# Patient Record
Sex: MALE | Race: WHITE
[De-identification: names, ages, dates, MRNs, and addresses within clinical notes are randomized per-mention and may not be internally consistent; named-entity substitution may affect disease eponyms.]

---

## 2017-07-12 ENCOUNTER — HOSPITAL ENCOUNTER (OUTPATIENT)
Dept: HOSPITAL 42 - ED | Age: 47
Setting detail: OBSERVATION
LOS: 1 days | Discharge: HOME | End: 2017-07-13
Attending: FAMILY MEDICINE | Admitting: FAMILY MEDICINE
Payer: COMMERCIAL

## 2017-07-12 VITALS — BODY MASS INDEX: 39.9 KG/M2

## 2017-07-12 DIAGNOSIS — I25.10: ICD-10-CM

## 2017-07-12 DIAGNOSIS — I25.2: ICD-10-CM

## 2017-07-12 DIAGNOSIS — E11.65: Primary | ICD-10-CM

## 2017-07-12 DIAGNOSIS — Z79.84: ICD-10-CM

## 2017-07-12 DIAGNOSIS — I87.2: ICD-10-CM

## 2017-07-12 DIAGNOSIS — E66.01: ICD-10-CM

## 2017-07-12 DIAGNOSIS — I50.9: ICD-10-CM

## 2017-07-12 DIAGNOSIS — Z79.82: ICD-10-CM

## 2017-07-12 DIAGNOSIS — Z95.5: ICD-10-CM

## 2017-07-12 DIAGNOSIS — H81.10: ICD-10-CM

## 2017-07-12 DIAGNOSIS — Z87.891: ICD-10-CM

## 2017-07-12 LAB
ALBUMIN SERPL-MCNC: 2.8 G/DL (ref 3–4.8)
ALBUMIN/GLOB SERPL: 0.9 {RATIO} (ref 1.1–1.8)
ALT SERPL-CCNC: 19 U/L (ref 7–56)
APPEARANCE UR: CLEAR
APTT BLD: 27.1 SECONDS (ref 23.7–30.8)
AST SERPL-CCNC: 15 U/L (ref 15–59)
BACTERIA #/AREA URNS HPF: (no result) /[HPF]
BASOPHILS # BLD AUTO: 0.01 K/MM3 (ref 0–2)
BASOPHILS NFR BLD: 0.1 % (ref 0–3)
BILIRUB UR-MCNC: NEGATIVE MG/DL
BNP SERPL-MCNC: 1040 PG/ML (ref 0–450)
BUN SERPL-MCNC: 24 MG/DL (ref 7–21)
CALCIUM SERPL-MCNC: 8.2 MG/DL (ref 8.4–10.5)
COLOR UR: YELLOW
EOSINOPHIL # BLD: 0.2 10*3/UL (ref 0–0.7)
EOSINOPHIL NFR BLD: 1.6 % (ref 1.5–5)
ERYTHROCYTE [DISTWIDTH] IN BLOOD BY AUTOMATED COUNT: 14.5 % (ref 11.5–14.5)
GFR NON-AFRICAN AMERICAN: 54
GLUCOSE UR STRIP-MCNC: >=1000 MG/DL
GRANULOCYTES # BLD: 7.32 10*3/UL (ref 1.4–6.5)
GRANULOCYTES NFR BLD: 77.1 % (ref 50–68)
HGB BLD-MCNC: 12.4 GM/DL (ref 14–18)
INR PPP: 0.93 (ref 0.93–1.08)
LEUKOCYTE ESTERASE UR-ACNC: NEGATIVE LEU/UL
LYMPHOCYTES # BLD: 1.4 10*3/UL (ref 1.2–3.4)
LYMPHOCYTES NFR BLD AUTO: 14.4 % (ref 22–35)
MCH RBC QN AUTO: 24 PG (ref 25–35)
MCHC RBC AUTO-ENTMCNC: 32.5 G/DL (ref 31–37)
MCV RBC AUTO: 73.9 FL (ref 80–105)
MONOCYTES # BLD AUTO: 0.7 10*3/UL (ref 0.1–0.6)
MONOCYTES NFR BLD: 6.8 % (ref 1–6)
PH UR STRIP: 6 [PH] (ref 4.7–8)
PLATELET # BLD: 255 10^3/UL (ref 120–450)
PMV BLD AUTO: 9.8 FL (ref 7–11)
PROT UR STRIP-MCNC: 100 MG/DL
PROTHROMBIN TIME: 10 SECONDS (ref 9.9–11.8)
RBC # BLD AUTO: 5.17 10^6/UL (ref 3.5–6.1)
RBC # UR STRIP: (no result) /UL
RBC #/AREA URNS HPF: (no result) /HPF (ref 0–2)
SP GR UR STRIP: 1.01 (ref 1–1.03)
TROPONIN I SERPL-MCNC: < 0.01 NG/ML
URINE NITRATE: NEGATIVE
UROBILINOGEN UR STRIP-ACNC: 0.2 E.U./DL
WBC # BLD AUTO: 9.5 10^3/UL (ref 4.5–11)
WBC #/AREA URNS HPF: NEGATIVE /HPF (ref 0–6)

## 2017-07-12 PROCEDURE — 80053 COMPREHEN METABOLIC PANEL: CPT

## 2017-07-12 PROCEDURE — 83880 ASSAY OF NATRIURETIC PEPTIDE: CPT

## 2017-07-12 PROCEDURE — 99285 EMERGENCY DEPT VISIT HI MDM: CPT

## 2017-07-12 PROCEDURE — 83036 HEMOGLOBIN GLYCOSYLATED A1C: CPT

## 2017-07-12 PROCEDURE — 84484 ASSAY OF TROPONIN QUANT: CPT

## 2017-07-12 PROCEDURE — 96374 THER/PROPH/DIAG INJ IV PUSH: CPT

## 2017-07-12 PROCEDURE — 82550 ASSAY OF CK (CPK): CPT

## 2017-07-12 PROCEDURE — 36415 COLL VENOUS BLD VENIPUNCTURE: CPT

## 2017-07-12 PROCEDURE — 93005 ELECTROCARDIOGRAM TRACING: CPT

## 2017-07-12 PROCEDURE — 85730 THROMBOPLASTIN TIME PARTIAL: CPT

## 2017-07-12 PROCEDURE — 82948 REAGENT STRIP/BLOOD GLUCOSE: CPT

## 2017-07-12 PROCEDURE — 85025 COMPLETE CBC W/AUTO DIFF WBC: CPT

## 2017-07-12 PROCEDURE — 93306 TTE W/DOPPLER COMPLETE: CPT

## 2017-07-12 PROCEDURE — 85610 PROTHROMBIN TIME: CPT

## 2017-07-12 PROCEDURE — 81001 URINALYSIS AUTO W/SCOPE: CPT

## 2017-07-12 PROCEDURE — 71010: CPT

## 2017-07-12 PROCEDURE — 83615 LACTATE (LD) (LDH) ENZYME: CPT

## 2017-07-12 PROCEDURE — 85027 COMPLETE CBC AUTOMATED: CPT

## 2017-07-12 RX ADMIN — INSULIN HUMAN SCH UNITS: 100 INJECTION, SOLUTION PARENTERAL at 18:53

## 2017-07-12 RX ADMIN — INSULIN HUMAN SCH: 100 INJECTION, SOLUTION PARENTERAL at 21:47

## 2017-07-12 RX ADMIN — INSULIN LISPRO SCH: 100 INJECTION, SOLUTION INTRAVENOUS; SUBCUTANEOUS at 21:46

## 2017-07-12 RX ADMIN — INSULIN LISPRO SCH UNITS: 100 INJECTION, SOLUTION INTRAVENOUS; SUBCUTANEOUS at 18:54

## 2017-07-12 RX ADMIN — INSULIN LISPRO SCH: 100 INJECTION, SOLUTION INTRAVENOUS; SUBCUTANEOUS at 18:54

## 2017-07-12 NOTE — CP.PCM.HP
History of Present Illness





- History of Present Illness


History of Present Illness: 





45 y/o w/ m w/ hx of dizzy and seen in the office and given meclizine and 

couldnt get it  too much money and came to the er dizzy 





Present on Admission





- Present on Admission


Any Indicators Present on Admission: Yes


History of DVT/PE: No


History of Uncontrolled Diabetes: Yes


Urinary Catheter: No


Decubitus Ulcer Present: No





Review of Systems





- Neurological


Neurological: Dizziness, Vertigo





Past Patient History





- Infectious Disease


Hx of Infectious Diseases: None





- Tetanus Immunizations


Tetanus Immunization: Unknown





- Past Medical History & Family History


Past Medical History?: No





- Past Social History


Smoking Status: Light Smoker < 10 Cigarettes Daily


Alcohol: None


Drugs: Denies


Home Situation {Lives}: With Family





- CARDIAC


Hx Cardiac Disorders: No


Other/Comment: cardiac stent x3 2 months ago





- PULMONARY


Hx Respiratory Disorders: No





- NEUROLOGICAL


Hx Paralysis: No





- HEENT


Hx HEENT Problems: No





- RENAL


Hx Chronic Kidney Disease: No





- ENDOCRINE/METABOLIC


Hx Endocrine Disorders: No


Hx Diabetes Mellitus Type 2: Yes





- HEMATOLOGICAL/ONCOLOGICAL


Hx Blood Disorders: No





- INTEGUMENTARY


Hx Dermatological Problems: No


Hx Cellulitis:  (periorbital)





- MUSCULOSKELETAL/RHEUMATOLOGICAL


Hx Musculoskeletal Disorders: No





- GASTROINTESTINAL


Hx Gastrointestinal Disorders: No





- GENITOURINARY/GYNECOLOGICAL


Hx Genitourinary Disorders: No





- PSYCHIATRIC


Hx Psychophysiologic Disorder: No


Hx Substance Use: No





- SURGICAL HISTORY


Hx Surgeries: No





- ANESTHESIA


Hx Anesthesia Reactions: No


Hx Malignant Hyperthermia: No





Meds


Allergies/Adverse Reactions: 


 Allergies











Allergy/AdvReac Type Severity Reaction Status Date / Time


 


No Known Allergies Allergy   Verified 10/28/16 05:36














Physical Exam





- Constitutional


Appears: No Acute Distress





- Head Exam


Head Exam: ATRAUMATIC, NORMAL INSPECTION, NORMOCEPHALIC





- Eye Exam


Eye Exam: Normal appearance





- ENT Exam


ENT Exam: Mucous Membranes Moist





- Neck Exam


Neck exam: Positive for: Normal Inspection





- Respiratory Exam


Respiratory Exam: Decreased Breath Sounds, Clear to Auscultation Bilateral





- Cardiovascular Exam


Cardiovascular Exam: REGULAR RHYTHM





- GI/Abdominal Exam


GI & Abdominal Exam: Normal Bowel Sounds, Soft





- Extremities Exam


Extremities exam: Positive for: pedal edema





- Back Exam


Back exam: NORMAL INSPECTION





- Neurological Exam


Neurological exam: Alert, CN II-XII Intact, Oriented x3





- Psychiatric Exam


Psychiatric exam: Normal Affect





- Skin


Skin Exam: Warm





Results





- Vital Signs


Recent Vital Signs: 





 Last Vital Signs











Temp  98.2 F   07/12/17 09:17


 


Pulse  76   07/12/17 13:06


 


Resp  18   07/12/17 13:06


 


BP  133/76   07/12/17 13:06


 


Pulse Ox  95   07/12/17 13:06














- Labs


Result Diagrams: 


 07/12/17 09:45





 07/12/17 09:45


Labs: 





 Laboratory Results - last 24 hr











  07/12/17 07/12/17





  12:45 13:29


 


POC Glucose (mg/dL)  > 500 H*  423 H*














Assessment & Plan





- Assessment and Plan (Free Text)


Assessment: 





dizzy hi bs chf edema low k 


Plan: 





cardio neuro endo evals insulins k lasix iv meds went over tests labs cxr  

watch in obs discussed w/ [id number] aswered questions





- Date & Time


Date: 07/12/17


Time: 15:00

## 2017-07-12 NOTE — ED PDOC
Arrival/HPI





- General


Chief Complaint: Dizziness/Lightheaded


Time Seen by Provider: 07/12/17 09:27


Historian: Patient





- History of Present Illness


Narrative History of Present Illness (Text): 





07/12/17 10:39


45yo male with PMhx of Diabetes, CHF, CAD with 3stents present with complaint 

of positional dizziness since this morning. Described dizziness as "spinning  

sensation". He notes history of dizziness/vertigo x months. States he saw his 

PMD on Friday for the same complaint and was given unknown prescription, which 

he haven't . He denies chest pain, SOB, calf pain, focal weakness, 

dysphagia, tinnitus, recent URI, fever, chills, any other complaint.





Past Medical History





- Provider Review


Nursing Documentation Reviewed: Yes





- Infectious Disease


Hx of Infectious Diseases: None





- Tetanus Immunization


Tetanus Immunization: Unknown





- Cardiac


Hx Cardiac Disorders: No


Other/Comment: cardiac stent x3 2 months ago





- Pulmonary


Hx Respiratory Disorders: No





- Neurological


Hx Paralysis: No





- HEENT


Hx HEENT Disorder: No





- Renal


Hx Renal Disorder: No





- Endocrine/Metabolic


Hx Endocrine Disorders: No


Hx Diabetes Mellitus Type 2: Yes





- Hematological/Oncological


Hx Blood Disorders: No





- Integumentary


Hx Dermatological Disorder: No


Hx Cellulitis:  (periorbital)





- Musculoskeletal/Rheumatological


Hx Musculoskeletal Disorders: No





- Gastrointestinal


Hx Gastrointestinal Disorders: No





- Genitourinary/Gynecological


Hx Genitourinary Disorders: No





- Psychiatric


Hx Psychophysiologic Disorder: No


Hx Substance Use: No





- Anesthesia


Hx Anesthesia Reactions: No


Hx Malignant Hyperthermia: No





- Suicidal Assessment


Feels Threatened In Home Enviroment: No





Family/Social History





- Physician Review


Nursing Documentation Reviewed: Yes


Family/Social History: Unknown Family HX


Smoking Status: Light Smoker < 10 Cigarettes Daily


Hx Alcohol Use: No


Hx Substance Use: No





Allergies/Home Meds


Allergies/Adverse Reactions: 


Allergies





No Known Allergies Allergy (Verified 07/12/17 15:13)


 








Home Medications: 


 Home Meds











 Medication  Instructions  Recorded  Confirmed


 


MetFORMIN [glucOPHAGE] 500 mg PO BID 10/24/16 07/12/17


 


Aspirin [Aspirin Chewable] 81 mg PO DAILY 10/29/16 07/12/17


 


Atorvastatin [Lipitor] 40 mg PO DAILY 07/12/17 07/12/17


 


Clopidogrel [Plavix] 75 mg PO DAILY 07/12/17 07/12/17


 


Furosemide [Lasix] 40 mg PO DAILY 07/12/17 07/12/17


 


Metoprolol Tartrate [Lopressor] 25 mg PO DAILY 07/12/17 07/12/17


 


amLODIPine [Norvasc] 2.5 mg PO DAILY 07/12/17 07/12/17














Review of Systems





- Physician Review


All systems were reviewed & negative as marked: Yes





- Review of Systems


Constitutional: Normal


Eyes: Normal


ENT: Normal


Respiratory: Normal


Cardiovascular: Normal


Gastrointestinal: Normal


Genitourinary Male: Normal


Musculoskeletal: Normal


Skin: Normal


Neurological: Dizziness


Endocrine: Normal


Hemo/Lymphatic: Normal


Psychiatric: Normal





Physical Exam


Vital Signs Reviewed: Yes


Vital Signs











  Temp Pulse Resp BP Pulse Ox


 


 07/12/17 13:06   76  18  133/76  95


 


 07/12/17 12:14     137/74 


 


 07/12/17 11:17   80  18  137/74  96


 


 07/12/17 09:17  98.2 F  86  19  142/75  94 L











Temperature: Afebrile


Blood Pressure: Normal


Pulse: Regular


Respiratory Rate: Normal


Appearance: Positive for: Well-Appearing, Non-Toxic, Comfortable, Other (

Morbidly obese)


Pain Distress: None


Mental Status: Positive for: Alert and Oriented X 3





- Systems Exam


Head: Present: Atraumatic, Normocephalic


Pupils: Present: PERRL


Extroacular Muscles: Present: EOMI


Conjunctiva: Present: Normal


Mouth: Present: Moist Mucous Membranes


Neck: Present: Normal Range of Motion


Respiratory/Chest: Present: Clear to Auscultation, Good Air Exchange.  No: 

Respiratory Distress, Accessory Muscle Use


Cardiovascular: Present: Regular Rate and Rhythm, Normal S1, S2.  No: Murmurs


Abdomen: Present: Normal Bowel Sounds.  No: Tenderness, Distention, Peritoneal 

Signs


Back: Present: Normal Inspection


Upper Extremity: Present: Normal Inspection.  No: Cyanosis, Edema


Lower Extremity: Present: Normal Inspection.  No: Edema


Neurological: Present: GCS=15, CN II-XII Intact, Speech Normal, Motor Func 

Grossly Intact, Normal Sensory Function, Normal Cerebellar Funct, Norm Deep 

Tendon Reflexes, Gait Normal, Memory Normal, Other (No focal neurological 

deficit)


Skin: Present: Warm, Dry, Normal Color.  No: Rashes


Psychiatric: Present: Alert, Oriented x 3, Normal Insight, Normal Concentration





Medical Decision Making


ED Course and Treatment: 





07/12/17 19:41


PT presented for stated history. He was hemdynamically stable in ED. Ge had 

elevated BS >600 with no anion gap and increased BNP >1000. His BS remain 

elevated in ED s/p insulin. He was not hydrated secondary to the elevated BNP 

and his h/o CHF. states his dizziness improved in ED with medication.





Case was DW Dr. Castañeda and pt was placed on Tele OBS for hyperglycemia, CHF 

exacerbation and dizziness. He requested consult of Romeo Malagon, [place] and 








CXR Cardiomeglay without NAD





EKG NSR at normal interval @86bpm. No ST changes.





Result and plan was DW the pt and he agreed to plan











- Lab Interpretations


Lab Results: 








 07/12/17 09:45 





 07/12/17 09:45 





 Lab Results





07/12/17 10:47: POC Glucose (mg/dL) > 500 H*


07/12/17 10:30: Urine Color Yellow, Urine Appearance Clear, Urine pH 6.0, Ur 

Specific Gravity 1.015, Urine Protein 100 H, Urine Glucose (UA) >=1000, Urine 

Ketones Negative, Urine Blood Small H, Urine Nitrate Negative, Urine Bilirubin 

Negative, Urine Urobilinogen 0.2, Ur Leukocyte Esterase Negative, Urine RBC 0 - 

2, Urine WBC Negative, Urine Bacteria Occ


07/12/17 09:45: PT 10.0, INR 0.93, APTT 27.1


07/12/17 09:45: WBC 9.5, RBC 5.17, Hgb 12.4 L, Hct 38.2 L, MCV 73.9 L, MCH 24.0 

L, MCHC 32.5, RDW 14.5, Plt Count 255, MPV 9.8, Gran % 77.1 H, Lymph % (Auto) 

14.4 L, Mono % (Auto) 6.8 H, Eos % (Auto) 1.6, Baso % (Auto) 0.1, Gran # 7.32 H

, Lymph # 1.4, Mono # 0.7 H, Eos # 0.2, Baso # 0.01


07/12/17 09:45: Sodium 130 L, Potassium 3.0 L, Chloride 94 L, Carbon Dioxide 29

, Anion Gap 10, BUN 24 H, Creatinine 1.4, Est GFR (African Amer) > 60, Est GFR (

Non-Af Amer) 54, Random Glucose 624 H* D, Calcium 8.2 L, Total Bilirubin 0.4, 

AST 15, ALT 19, Alkaline Phosphatase 119, Lactate Dehydrogenase 421, Total 

Creatine Kinase 154, Troponin I < 0.01  D, NT-Pro-B Natriuret Pep 1040 H, Total 

Protein 6.0, Albumin 2.8 L, Globulin 3.2, Albumin/Globulin Ratio 0.9 L


07/12/17 09:27: POC Glucose (mg/dL) > 500 H*











- RAD Interpretation


Radiology Orders: 








07/12/17 09:49


CHEST PORTABLE [RAD] Stat 














- Medication Orders


Current Medication Orders: 








Amlodipine Besylate (Norvasc)  2.5 mg PO DAILY [facility]


Aspirin (Aspirin Chewable)  81 mg PO DAILY [facility]


Atorvastatin Calcium (Lipitor)  40 mg PO DAILY [facility]


Clopidogrel Bisulfate (Plavix)  75 mg PO DAILY [facility]


Furosemide (Lasix)  40 mg IVP DAILY [facility]


   Last Admin: 07/12/17 17:17  Dose: 40 mg





Insulin Detemir (Levemir)  30 unit SC HS [facility]


Insulin Human Lispro (Humalog)  12 units SC AC [facility]


   Last Admin: 07/12/17 18:54  Dose: 12 units





Insulin Human Lispro (Humalog Low)  0 units SC ACHS [facility]


   PRN Reason: Protocol


   Last Admin: 07/12/17 18:54  Dose:  





Insulin Human Regular (Humulin R High)  0 units SC ACHS [facility]


   PRN Reason: Protocol


   Last Admin: 07/12/17 18:53  Dose: 10 units





Metformin HCl (Glucophage)  500 mg PO BID [facility]


   Last Admin: 07/12/17 17:16  Dose: 500 mg





Metoprolol Tartrate (Lopressor)  25 mg PO DAILY [facility]


Potassium Chloride (K-Dur 20 Meq Er Tab)  20 meq PO BID [facility]


   Last Admin: 07/12/17 17:16  Dose: 20 meq





Discontinued Medications





Furosemide (Lasix)  40 mg IVP STAT STA


   Stop: 07/12/17 11:38


   Last Admin: 07/12/17 12:14  Dose: 40 mg





Insulin Human Regular (Humulin R Med)  10 units SC ACHS [facility]


   PRN Reason: Protocol


   Last Admin: 07/12/17 11:00  Dose:  





Insulin Human Regular (Humulin R) Confirm Administered Dose 10 units .ROUTE .STK

-MED ONE


   Stop: 07/12/17 10:51


   Last Admin: 07/12/17 10:58  Dose: 10 units





Insulin Human Regular (Humulin R) Confirm Administered Dose 10 units .ROUTE .STK

-MED ONE


   Stop: 07/12/17 10:55


   Last Admin: 07/12/17 11:00  Dose:  





Insulin Human Regular (Humulin R)  10 units IV ONCE STA


   PRN Reason: Protocol


   Stop: 07/12/17 12:51


   Last Admin: 07/12/17 13:00  Dose: 10 units





Insulin Human Regular (Humulin R) Confirm Administered Dose 10 units .ROUTE .STK

-MED ONE


   Stop: 07/12/17 12:57


   Last Admin: 07/12/17 13:00  Dose:  





Meclizine HCl (Antivert)  25 mg PO STAT STA


   Stop: 07/12/17 10:35


   Last Admin: 07/12/17 10:43  Dose: 25 mg





Pneumococcal Polyvalent Vaccine (Pneumovax 23 Vaccine)  0.5 ml IM .ONCE ONE


   Stop: 07/12/17 17:50











Disposition/Present on Arrival





- Present on Arrival


Any Indicators Present on Arrival: No


History of DVT/PE: No


History of Uncontrolled Diabetes: No


Urinary Catheter: No


History of Decub. Ulcer: No


History Surgical Site Infection Following: None





- Disposition


Have Diagnosis and Disposition been Completed?: Yes


Diagnosis: 


 CHF exacerbation, Hyperglycemia, Dizziness





Disposition: HOSPITALIZED


Disposition Time: 11:45


Patient Problems: 


 Current Active Problems











Problem Status Onset


 


CHF exacerbation Acute  


 


Dizziness Acute  


 


Hyperglycemia Acute  











Condition: FAIR

## 2017-07-12 NOTE — CARD
--------------- APPROVED REPORT --------------





EKG Measurement

Heart [id number]

DE [id number]

[id number]

[id number]

[id number]



<Conclusion>

Normal sinus rhythm

Cannot rule out Anterior infarct, age undetermined

Abnormal ECG

## 2017-07-12 NOTE — RAD
HISTORY:

dizziness  



COMPARISON:

10/28/2016 



FINDINGS:



LUNGS:

No active pulmonary disease.



PLEURA:

No significant pleural effusion identified, no pneumothorax apparent.



CARDIOVASCULAR:

Moderate cardiomegaly



OSSEOUS STRUCTURES:

No significant abnormalities.



VISUALIZED UPPER ABDOMEN:

Normal.



OTHER FINDINGS:

None.



IMPRESSION:

No active disease.

## 2017-07-13 VITALS — DIASTOLIC BLOOD PRESSURE: 86 MMHG | OXYGEN SATURATION: 98 % | TEMPERATURE: 99.6 F | SYSTOLIC BLOOD PRESSURE: 163 MMHG

## 2017-07-13 VITALS — RESPIRATION RATE: 20 BRPM

## 2017-07-13 VITALS — HEART RATE: 81 BPM

## 2017-07-13 LAB
ALBUMIN SERPL-MCNC: 2.9 G/DL (ref 3–4.8)
ALBUMIN/GLOB SERPL: 0.9 {RATIO} (ref 1.1–1.8)
ALT SERPL-CCNC: 19 U/L (ref 7–56)
AST SERPL-CCNC: 16 U/L (ref 15–59)
BUN SERPL-MCNC: 21 MG/DL (ref 7–21)
CALCIUM SERPL-MCNC: 8.2 MG/DL (ref 8.4–10.5)
ERYTHROCYTE [DISTWIDTH] IN BLOOD BY AUTOMATED COUNT: 14.8 % (ref 11.5–14.5)
GFR NON-AFRICAN AMERICAN: 59
HGB BLD-MCNC: 13.4 GM/DL (ref 14–18)
MCH RBC QN AUTO: 24.6 PG (ref 25–35)
MCHC RBC AUTO-ENTMCNC: 34.2 G/DL (ref 31–37)
MCV RBC AUTO: 72.1 FL (ref 80–105)
PLATELET # BLD: 259 10^3/UL (ref 120–450)
PMV BLD AUTO: 9.4 FL (ref 7–11)
RBC # BLD AUTO: 5.44 10^6/UL (ref 3.5–6.1)
WBC # BLD AUTO: 11.1 10^3/UL (ref 4.5–11)

## 2017-07-13 RX ADMIN — INSULIN LISPRO SCH UNITS: 100 INJECTION, SOLUTION INTRAVENOUS; SUBCUTANEOUS at 09:11

## 2017-07-13 RX ADMIN — INSULIN HUMAN SCH: 100 INJECTION, SOLUTION PARENTERAL at 09:12

## 2017-07-13 RX ADMIN — INSULIN LISPRO SCH: 100 INJECTION, SOLUTION INTRAVENOUS; SUBCUTANEOUS at 08:32

## 2017-07-13 NOTE — HP
HISTORY OF PRESENT ILLNESS:  I know Irvin fairly well.  He was in the

office the other day.  I put him on meclizine for his dizziness. 

Apparently, he was not able to get it due to his insurance and cost, and he

got more dizzy and ended up in the emergency room at [facility].  He was seen here by the emergency room doctor this morning.  He

told them that his head was with spinning sensation, history of dizziness

and vertigo for months.



PAST MEDICAL HISTORY:  Diabetes type 2, CHF, CAD, coronary artery disease

with 3 stents, dizziness.  Unknown surgery.



FAMILY HISTORY:  Hypertension, diabetes in the family.



SOCIAL HISTORY:  Light smoker.  No alcohol, no drugs.



ALLERGIES:  No known drug allergies.



MEDICATIONS:  He takes metformin 500 twice a day, aspirin 81 mg daily,

Lasix 40 mg daily, Lipitor 40 mg daily, Lopressor 25 mg daily, Norvasc 2.5

mg daily, Plavix 75 mg daily, and has unknown medication that the ER did

not know, that was meclizine 25 mg 3 times a day, which they could not get.



REVIEW OF SYSTEMS:  He has dizziness, like the room is spinning.  No change

in vision or hearing.  No sore throat.  No shortness of breath.  No chest

pain.  No problems going to the bathroom.  No diarrhea, constipation,

nausea, vomiting or problems urinating.  No back pain or leg pain.  Skin is

intact.  He is dizzy.  No depression or anxiety.



PHYSICAL EXAMINATION

GENERAL:  He is well appearing, nontoxic, comfortable, morbidly obese. 

Alert and oriented x3.

VITAL SIGNS:  98.2 temperature, 86 pulse, 19 respiratory rate, 142/75 blood

pressure and 94% O2 sat on room air.

HEENT:  Head is atraumatic and normocephalic.  Extraocular muscles intact. 

Pupils are equal and reactive to light and accommodation.  Throat is moist.

NECK:  Supple.

CARDIOPULMONARY:  Regular rate.

LUNGS:  Decreased breath sounds.  Clear to auscultation.

ABDOMEN:  Soft, morbidly obese, nontender.  Positive bowel sounds.  No

guarding.  No rebound.

EXTREMITIES:  With +1/4 pitting edema bilaterally.

NEUROLOGIC:  GCS is 15.  Speech is normal.  Cranial nerves II through XII

grossly intact.  Alert and oriented x3, but he is dizzy, feels like the

room is spinning.

SKIN:  Warm and dry.



LABORATORY DATA:  He had multiple tests.  He has 9.5 white count, 12.4

hemoglobin, 30.2 hematocrit with 255 platelets.  INR is 0.93.  He has 130

sodium; potassium 3.0, I gave potassium K-Dur 20 twice a day; BUN 24;

creatinine 1.4; GFR is 54.  His blood sugars were 500 and 624 and 500 and

423, I have put him on multiple insulin coverage and insulin doses, plus

the endocrinologist will be seeing him.  Calcium is 8.2.  Total bilirubin

is 0.4, AST is 15, ALT is 19, alkaline phosphatase 119.  Lactate

dehydrogenase is 421.  Total creatinine clearance is 154.  First troponin

less than 0.01.  BNP is high at 1040, I gave him Lasix 40 IV.   Total

protein 6, globulin 3.2.  Urine has occasional bacteria and small.  He had

a chest x-ray which was no acute disease.



IMPRESSION:  He is here dizzy in Observation, with elevated blood sugars. 

He will be seen by Neurology, Cardiology and Endocrinology.  We will watch

him closely on observation status, and he is here for dizziness.







__________________________________________

Jair Castañeda DO







DD:  07/12/2017 15:03:06

DT:  07/13/2017 4:11:58

Job # [id number]

## 2017-07-13 NOTE — CARD
--------------- APPROVED REPORT --------------





EXAM: Two-dimensional and M-mode echocardiogram with Doppler and 

color Doppler.



INDICATION

Congestive Heart Failure 



2D DIMENSIONS 

Left Atrium (2D)4.0   (1.6-4.0cm)IVSd1.5   (0.7-1.1cm)

Aortic Root (2D)3.1   (2.0-3.7cm)LVDd5.0   (3.9-5.9cm)

PWd1.4   (0.7-1.1cm)LVDs3.5   (2.5-4.0cm)

FS (%) 29.9   %



M-Mode DIMENSIONS 

Aortic Cusp Exc.2.10   (1.5-2.0cm)



Aortic Valve

AoV Peak Yoxiudyv746.0cm/John Peak GR.4mmHg



Mitral Valve

MV E Lfzvuhmt01.3cm/sMV A Tjszoybw75.0cm/sE/A ratio1.3



TDI

Lateral E' Peak V4.93cm/sMedial E' Peak V4.29cm/sE/Lateral E'20.1

E/Medial E'23.1



Pulmonary Valve

PV Peak Yyppkmpo797.0cm/sPV Peak Grad.5mmHg



 LEFT VENTRICLE 

The left ventricle is normal size. There is mild to moderate 

concentric left ventricular hypertrophy. The systolic function is 

moderately to severely impaired. The Ejection Fraction is 35-40%. 

There is global hypokinesis of the left ventricle. No left ventricle 

thrombus noted on this study.



 RIGHT VENTRICLE 

The right ventricle is normal size. There is normal right ventricular 

wall thickness. The right ventricular systolic function is normal.



 ATRIA 

The left atrium is mildly dilated. The right atrium size is normal.



 AORTIC VALVE 

The aortic valve is not well visualized.



 MITRAL VALVE 

The mitral valve is normal in structure.



 GREAT VESSELS 

The aortic root is normal in size. The IVC collapses <50% with 

inspiration.



 PERICARDIAL EFFUSION 

There is a trace loculated anterior pericardial effusion.



<Conclusion>

The left ventricle is normal size.

There is mild to moderate concentric left ventricular hypertrophy.

The systolic function is moderately to severely impaired.

The Ejection Fraction is 35-40%.

There is global hypokinesis of the left ventricle.

## 2017-07-14 NOTE — PN
LOCATION:  Room 375.



SUBJECTIVE:  This is a 46-year-old male with recent uncontrolled type 2

insulin requiring diabetes, presenting here yesterday with marked

hyperglycemic accelerations as noted.  He is being followed closely now for

metabolic and diabetic management.  His latest glucose values are still

elevated, but much improved overnight with glucose values ranging from

209-250 mg/dL today.  It was 310-423 and over 500 mg/dL last night as

noted.  His hemoglobin A1c is 14.6%, which is quite elevated and indicative

of suboptimal metabolic control with diabetic condition.  His latest

chemistry shows a BUN of 21, sodium 135, potassium 2.9, chloride 97, CO2

30, glucose 275 and creatinine 1.3, so at this time, we will modify his

basal and bolus insulin regimen and increase the Levemir to 40 units

subcutaneous at bedside daily to start tonight.  We will also increase the

Humalog to 14 units subcutaneous t.i.d. before meals as ordered.  We will

continue the low-dose correction scale using Humalog insulin as ordered. 

We will titrate *------* to optimize metabolic control.  We will follow

this.







__________________________________________

Leti Vincent MD



DD:  07/13/2017 19:21:33

DT:  07/14/2017 0:46:30

Job # [id number]

## 2017-07-17 NOTE — PN
LOCATION:  Room 375.



SUBJECTIVE:  This is a 46-year-old male with recent uncontrolled type 2

insulin requiring diabetes, presenting here yesterday with marked

hyperglycemic accelerations as noted.  He is being followed closely now for

metabolic and diabetic management.  His latest glucose values are still

elevated, but much improved overnight with glucose values ranging from

209-250 mg/dL today.  It was 310-423 and over 500 mg/dL last night as

noted.  His hemoglobin A1c is 14.6%, which is quite elevated and indicative

of suboptimal metabolic control with diabetic condition.  His latest

chemistry shows a BUN of 21, sodium 135, potassium 2.9, chloride 97, CO2

30, glucose 275 and creatinine 1.3, so at this time, we will modify his

basal and bolus insulin regimen and increase the Levemir to 40 units

subcutaneous at bedside daily to start tonight.  We will also increase the

Humalog to 14 units subcutaneous t.i.d. before meals as ordered.  We will

continue the low-dose correction scale using Humalog insulin as ordered. 

We will titrate *------* to optimize metabolic control.  We will follow

this.







__________________________________________

Leti Vincent MD



DD:  07/13/2017 19:21:33

DT:  07/14/2017 0:46:30

Job # [id number]

## 2018-01-15 ENCOUNTER — HOSPITAL ENCOUNTER (INPATIENT)
Dept: HOSPITAL 42 - ED | Age: 48
LOS: 6 days | Discharge: LEFT BEFORE DISCHARGE | DRG: 550 | End: 2018-01-21
Attending: FAMILY MEDICINE | Admitting: FAMILY MEDICINE
Payer: MEDICAID

## 2018-01-15 VITALS — BODY MASS INDEX: 41.9 KG/M2

## 2018-01-15 DIAGNOSIS — Z95.5: ICD-10-CM

## 2018-01-15 DIAGNOSIS — E11.21: ICD-10-CM

## 2018-01-15 DIAGNOSIS — L03.116: ICD-10-CM

## 2018-01-15 DIAGNOSIS — E11.621: ICD-10-CM

## 2018-01-15 DIAGNOSIS — Z79.4: ICD-10-CM

## 2018-01-15 DIAGNOSIS — L97.429: ICD-10-CM

## 2018-01-15 DIAGNOSIS — E11.65: ICD-10-CM

## 2018-01-15 DIAGNOSIS — E78.00: ICD-10-CM

## 2018-01-15 DIAGNOSIS — I11.0: ICD-10-CM

## 2018-01-15 DIAGNOSIS — E66.01: ICD-10-CM

## 2018-01-15 DIAGNOSIS — Z91.14: ICD-10-CM

## 2018-01-15 DIAGNOSIS — B95.62: ICD-10-CM

## 2018-01-15 DIAGNOSIS — N17.9: ICD-10-CM

## 2018-01-15 DIAGNOSIS — E11.52: Primary | ICD-10-CM

## 2018-01-15 DIAGNOSIS — E11.42: ICD-10-CM

## 2018-01-15 DIAGNOSIS — E11.628: ICD-10-CM

## 2018-01-15 DIAGNOSIS — I25.10: ICD-10-CM

## 2018-01-15 DIAGNOSIS — E87.6: ICD-10-CM

## 2018-01-15 DIAGNOSIS — R31.9: ICD-10-CM

## 2018-01-15 DIAGNOSIS — I25.2: ICD-10-CM

## 2018-01-15 DIAGNOSIS — Z87.891: ICD-10-CM

## 2018-01-15 DIAGNOSIS — Z79.82: ICD-10-CM

## 2018-01-15 DIAGNOSIS — Z91.19: ICD-10-CM

## 2018-01-15 DIAGNOSIS — I50.9: ICD-10-CM

## 2018-01-15 LAB
ALBUMIN SERPL-MCNC: 3 [, G/DL] (ref 3–4.8)
ALBUMIN/GLOB SERPL: 0.8 [,] (ref 1.1–1.8)
ALT SERPL-CCNC: 15 [, U/L] (ref 7–56)
AST SERPL-CCNC: 15 [, U/L] (ref 17–59)
BASE EXCESS BLDV CALC-SCNC: 4.8 [, MMOL/L] (ref 0–2)
BASE EXCESS BLDV CALC-SCNC: 5.3 [, MMOL/L] (ref 0–2)
BASOPHILS # BLD AUTO: 0.01 [, K/MM3] (ref 0–2)
BASOPHILS NFR BLD: 0.1 [, %] (ref 0–3)
BNP SERPL-MCNC: 4000 [, PG/ML] (ref 0–450)
BUN SERPL-MCNC: 15 [, MG/DL] (ref 7–21)
CALCIUM SERPL-MCNC: 8.2 [, MG/DL] (ref 8.4–10.5)
EOSINOPHIL # BLD: 0.2 [,] (ref 0–0.7)
EOSINOPHIL NFR BLD: 2 [, %] (ref 1.5–5)
ERYTHROCYTE [DISTWIDTH] IN BLOOD BY AUTOMATED COUNT: 14.4 [, %] (ref 11.5–14.5)
GFR NON-AFRICAN AMERICAN: 43 [,]
GRANULOCYTES # BLD: 8.58 [,] (ref 1.4–6.5)
GRANULOCYTES NFR BLD: 76.3 [, %] (ref 50–68)
HGB BLD-MCNC: 14.6 [, G/DL] (ref 14–18)
LYMPHOCYTES # BLD: 1.5 [,] (ref 1.2–3.4)
LYMPHOCYTES NFR BLD AUTO: 13.7 [, %] (ref 22–35)
MAGNESIUM SERPL-MCNC: 1.9 [, MG/DL] (ref 1.7–2.2)
MCH RBC QN AUTO: 25.3 [, PG] (ref 25–35)
MCHC RBC AUTO-ENTMCNC: 33.2 [, G/DL] (ref 31–37)
MCV RBC AUTO: 76.3 [, FL] (ref 80–105)
MONOCYTES # BLD AUTO: 0.9 [,] (ref 0.1–0.6)
MONOCYTES NFR BLD: 7.9 [, %] (ref 1–6)
PH BLDV: 7.3 [,] (ref 7.32–7.43)
PH BLDV: 7.33 [,] (ref 7.32–7.43)
PLATELET # BLD: 302 [, 10^3/UL] (ref 120–450)
PMV BLD AUTO: 9.2 [, FL] (ref 7–11)
RBC # BLD AUTO: 5.77 [, 10^6/UL] (ref 3.5–6.1)
VENOUS BLOOD FIO2: 21 [, %]
VENOUS BLOOD FIO2: 21 [, %]
VENOUS BLOOD GAS PCO2: 63 [,] (ref 40–60)
VENOUS BLOOD GAS PCO2: 68 [,] (ref 40–60)
VENOUS BLOOD GAS PO2: 36 [, MM/HG] (ref 30–55)
VENOUS BLOOD GAS PO2: 43 [, MM/HG] (ref 30–55)
WBC # BLD AUTO: 11.3 [, 10^3/UL] (ref 4.5–11)

## 2018-01-15 RX ADMIN — INSULIN DETEMIR SCH UNIT: 100 INJECTION, SOLUTION SUBCUTANEOUS at 22:24

## 2018-01-15 RX ADMIN — PIPERACILLIN AND TAZOBACTAM SCH MLS/HR: 3; .375 INJECTION, POWDER, LYOPHILIZED, FOR SOLUTION INTRAVENOUS; PARENTERAL at 19:43

## 2018-01-15 RX ADMIN — INSULIN HUMAN SCH UNITS: 100 INJECTION, SOLUTION PARENTERAL at 22:23

## 2018-01-15 NOTE — RAD
HISTORY:

medical clearance  



COMPARISON:

07/12/2017 



FINDINGS:



LUNGS:

No active pulmonary disease.



PLEURA:

No significant pleural effusion identified, no pneumothorax apparent.



CARDIOVASCULAR:

Normal.



OSSEOUS STRUCTURES:

No significant abnormalities.



VISUALIZED UPPER ABDOMEN:

Normal.



OTHER FINDINGS:

None.



IMPRESSION:

No active disease.

## 2018-01-15 NOTE — US
PROCEDURE:  Left lower extremity venous US



HISTORY:

Leg pain and swelling. Evaluate for DVT.



PHYSICIAN(S):  Hoang Roman MD.



TECHNIQUE:

Duplex sonography and color-flow Doppler with graded compression were 

used to evaluate the deep venous system of the left lower extremity. 

The exam is limited by edema.



FINDINGS:

The visualized deep venous system of the left lower extremity is 

sonographically normal and compressible. Normal wave forms and 

augmentation are seen. There is no sonographic evidence for deep 

venous thrombosis in the visualized segments of the left lower 

extremity.



IMPRESSION:

1. No sonographic evidence for deep venous thrombosis in the 

visualized segments of the left lower extremity.

## 2018-01-15 NOTE — RAD
PROCEDURE:  Left Foot Radiographs.



HISTORY:

lt. foot heel cellulitis and wound  



COMPARISON:

None.



FINDINGS:



BONES:

Normal. No fracture. 



JOINTS:

Normal. 



SOFT TISSUES:

Normal. 



OTHER FINDINGS:

None.



IMPRESSION:

Normal left foot radiographs.

## 2018-01-15 NOTE — ED PDOC
Arrival/HPI





- General


Chief Complaint: Abnormal Skin Integrity


Time Seen by Provider: 01/15/18 12:33


Historian: Patient





- History of Present Illness


Narrative History of Present Illness (Text): 





01/15/18 13:32


48 y/o male, pmh including htn/hyperlipidemia/dm, nkda, last tetanus under 2 

years ago, c/o lt. foot heel wound s/p ruptured blister x 2 weeks.  Pt. stated 

that he bought a new pair of sneaker couple months ago and started wearing them

, started to have lt. foot heel blister about 2 weeks ago and ruptured, been 

having foul smell and chills for the past few days, unable to see the pmd, no 

numbness or tingling, no rash, no other medical or psychological complaints. 





Past Medical History





- Provider Review


Nursing Documentation Reviewed: Yes





- Infectious Disease


Hx of Infectious Diseases: None





- Tetanus Immunization


Tetanus Immunization: Unknown





- Cardiac


Hx Cardiac Disorders: No


Hx Congestive Heart Failure: Yes


Hx MI: Yes (x2)


Other/Comment: cardiac stent x3





- Pulmonary


Hx Respiratory Disorders: No





- Neurological


Hx Paralysis: No





- HEENT


Hx HEENT Disorder: No





- Renal


Hx Renal Disorder: No





- Endocrine/Metabolic


Hx Endocrine Disorders: No


Hx Diabetes Mellitus Type 2: Yes





- Hematological/Oncological


Hx Blood Disorders: No





- Integumentary


Hx Dermatological Disorder: No


Hx Cellulitis:  (periorbital)





- Musculoskeletal/Rheumatological


Hx Musculoskeletal Disorders: No





- Gastrointestinal


Hx Gastrointestinal Disorders: No





- Genitourinary/Gynecological


Hx Genitourinary Disorders: No





- Psychiatric


Hx Psychophysiologic Disorder: No


Hx Substance Use: No





- Surgical History


Hx Coronary Stent: Yes (X3)





- Anesthesia


Hx Anesthesia: Yes


Hx Anesthesia Reactions: No


Hx Malignant Hyperthermia: No





- Suicidal Assessment


Feels Threatened In Home Enviroment: No





Family/Social History





- Physician Review


Nursing Documentation Reviewed: Yes


Family/Social History: Unknown Family HX


Smoking Status: Former Smoker


Hx Alcohol Use: No


Hx Substance Use: No





Allergies/Home Meds


Allergies/Adverse Reactions: 


Allergies





No Known Allergies Allergy (Verified 07/12/17 15:13)


 








Home Medications: 


 Home Meds











 Medication  Instructions  Recorded  Confirmed


 


MetFORMIN [glucoPHAGE] 500 mg PO BID 10/24/16 01/15/18


 


Aspirin [Aspirin Chewable] 81 mg PO DAILY 10/29/16 01/15/18


 


Atorvastatin [Lipitor] 40 mg PO DAILY 07/12/17 01/15/18


 


Clopidogrel [Plavix] 75 mg PO DAILY 07/12/17 01/15/18


 


Furosemide [Lasix] 40 mg PO DAILY 07/12/17 01/15/18


 


Metoprolol Tartrate [Lopressor] 25 mg PO DAILY 07/12/17 01/15/18


 


amLODIPine [Norvasc] 2.5 mg PO DAILY 07/12/17 01/15/18














Review of Systems





- Review of Systems


Constitutional: Other (+chills).  absent: Fatigue, Fevers


Eyes: absent: Vision Changes


ENT: absent: Hearing Changes


Respiratory: absent: SOB, Cough


Cardiovascular: absent: Chest Pain


Gastrointestinal: absent: Abdominal Pain, Diarrhea, Nausea, Vomiting


Musculoskeletal: absent: Arthralgias, Back Pain


Skin: Rash, Skin Lesions, Cellulitis.  absent: Pruritis, Laceration, Abscess, 

Ulcer, Other


Neurological: absent: Headache, Dizziness


Psychiatric: absent: Anxiety, Depression





Physical Exam


Vital Signs Reviewed: Yes


Vital Signs











  Temp Pulse Resp BP Pulse Ox


 


 01/15/18 13:04  98.0 F  92 H  18  150/93 H  97











Temperature: Afebrile


Blood Pressure: Normal


Pulse: Regular


Respiratory Rate: Normal


Appearance: Positive for: Well-Appearing, Non-Toxic


Pain Distress: Moderate


Mental Status: Positive for: Alert and Oriented X 3





- Systems Exam


Head: Present: Atraumatic, Normocephalic


Pupils: Present: PERRL


Extroacular Muscles: Present: EOMI


Conjunctiva: Present: Normal


Mouth: Present: Moist Mucous Membranes


Neck: Present: Normal Range of Motion


Respiratory/Chest: Present: Clear to Auscultation, Good Air Exchange.  No: 

Respiratory Distress, Accessory Muscle Use


Cardiovascular: Present: Regular Rate and Rhythm, Normal S1, S2.  No: Murmurs


Abdomen: Present: Normal Bowel Sounds.  No: Tenderness, Distention, Peritoneal 

Signs


Back: Present: Normal Inspection


Upper Extremity: Present: Normal Inspection.  No: Cyanosis, Edema


Lower Extremity: Present: Normal Inspection, Other (LLE: visible [id number] foul 

smell wound with medial/latera/ventral streaking and cellulitis, +DPPT pulses, 

capillary refill< 2 seconds, neurovascular intact. ).  No: Edema


Neurological: Present: GCS=15, Speech Normal, Motor Func Grossly Intact, Gait 

Normal, Memory Normal


Skin: Present: Warm, Dry, Normal Color.  No: Rashes


Psychiatric: Present: Alert, Oriented x 3, Normal Insight, Normal Concentration





Medical Decision Making


ED Course and Treatment: 





01/15/18 14:04


-labs/wound culture/vbg/blood culture


-Lt. foot xray


-cxr


-LLE venuous doppler


-IV morphine/vancomcyin/zosyn


-observe and reassess





01/15/18 16:17


-EKG: NSR @ 87 BPM, no ST elevation or depression, no T wave inversion. 


-Chest xray: No active disease.


-Lt. foot xray: Normal left foot radiographs.


-LLE Venuous Doppler: as per preliminary report, no acute DVT


-Labs are non-significant except WBC 11.3 with lactic acid 2.0 (not sepsis 

criteria on this institution, IV antbiotic and blood/wound cultures ordered), K

+ 3.1 (potassium chloride 20meq po ordered), Creatinine 1.7 (mildly elevated, 

renal insuffiency), Glucose is 543 to 396 (8 units insulin ordered, no anion gap

, no signs of DKA), BNP 4000 from 1040 (no cardiopulmonary complaints, will 

defer to PMD to managed).


-Pain controlled, will need admission for IV antibiotics. 





01/15/18 16:42


-I spoke to Dr. Castañeda, discussed about the case/labs/radiology study, agreed 

on the admission and request Dr. Dowling and Dr. Henley for routine 

consult. 


-I discussed with Dr. Tineo about the case and he will placed in the 

admission order. 





- Lab Interpretations


Lab Results: 








 01/15/18 14:10 





 01/15/18 14:10 





 Lab Results





01/15/18 16:26: POC Glucose (mg/dL) 396 H


01/15/18 14:10: WBC 11.3 H, RBC 5.77, Hgb 14.6, Hct 44.0, MCV 76.3 L, MCH 25.3, 

MCHC 33.2, RDW 14.4, Plt Count 302, MPV 9.2, Gran % 76.3 H, Lymph % (Auto) 13.7 

L, Mono % (Auto) 7.9 H, Eos % (Auto) 2.0, Baso % (Auto) 0.1, Gran # 8.58 H, 

Lymph # 1.5, Mono # 0.9 H, Eos # 0.2, Baso # 0.01


01/15/18 14:10: Sodium 134, Chloride 96 L, Potassium 3.1 L, Carbon Dioxide 30, 

Anion Gap 12, BUN 15, Creatinine 1.7 H, Est GFR ( Amer) 52, Est GFR (Non-

Af Amer) 43, Random Glucose 543 H*, Calcium 8.2 L, Magnesium 1.9, Total 

Bilirubin 0.4, AST 15 L, ALT 15, Alkaline Phosphatase 107, NT-Pro-B Natriuret 

Pep 4000 H, Total Protein 6.9, Albumin 3.0, Globulin 3.9, Albumin/Globulin 

Ratio 0.8 L


01/15/18 14:10: pO2 36, VBG pH 7.30 L, VBG pCO2 68.0 H*, VBG HCO3 33.5 H, VBG 

Total CO2 35.6 H, VBG O2 Sat (Calc) 75.2 H, VBG Base Excess 4.8 H, VBG 

Potassium 3.3 L, Sodium 138.0, Chloride 95.0 L, Glucose 559 H*, Lactate 2.0, 

FiO2 21.0, Venous Blood Potassium 3.3 L








I have reviewed the lab results: Yes





- RAD Interpretation


Radiology Orders: 








01/15/18 13:59


CHEST PORTABLE [RAD] Stat 





01/15/18 14:05


FOOT LEFT 3 VIEWS ROUTINE [RAD] Stat 


DUPLEX LOWER EXTRM VEIN LEFT [US] Stat 











Lt. Foot: 


PROCEDURE:  Left Foot Radiographs.





HISTORY:


lt. foot heel cellulitis and wound  





COMPARISON:


None.





FINDINGS:





BONES:


Normal. No fracture. 





JOINTS:


Normal. 





SOFT TISSUES:


Normal. 





OTHER FINDINGS:


None.





IMPRESSION:


Normal left foot radiographs.


------------------------------------------------------------------------


LLE venuous doppler: as per preliminary report, no acute DVT


-------------------------------------------------------------------------


Chest xray:


medical clearance  





COMPARISON:


07/12/2017 





FINDINGS:





LUNGS:


No active pulmonary disease.





PLEURA:


No significant pleural effusion identified, no pneumothorax apparent.





CARDIOVASCULAR:


Normal.





OSSEOUS STRUCTURES:


No significant abnormalities.





VISUALIZED UPPER ABDOMEN:


Normal.





OTHER FINDINGS:


None.





IMPRESSION:


No active disease.


: Radiologist





- EKG Interpretation


EKG Interpretation (Text): 





01/15/18 16:43


-EKG: NSR @ 87 BPM, no ST elevation or depression, no T wave inversion. 


Interpreted by ED Physician: Yes


Type: 12 lead EKG





- Medication Orders


Current Medication Orders: 











Discontinued Medications





Vancomycin HCl (Vancomycin 1gm)  1 gm in 250 mls @ 167 mls/hr IVPB STAT STA


   PRN Reason: Protocol


   Stop: 01/15/18 15:28


Piperacillin Sod/Tazobactam Sod (Zosyn 3.375 In Ns 100ml)  100 mls @ 200 mls/hr 

IVPB STAT STA


   PRN Reason: Protocol


   Stop: 01/15/18 14:28


   Last Admin: 01/15/18 15:01  Dose: 200 mls/hr





eMAR Start Stop


 Document     01/15/18 15:01  [facility]  (Rec: 01/15/18 15:02  [facility]  [id number])


     Intravenous Solution


      Start Date                                 01/15/18


      Start Time                                 15:02


      End Date                                   01/15/18


      End time                                   16:32


      Total Infusion Time                        90





Insulin Human Regular (Humulin R)  8 units SC STAT STA


   Stop: 01/15/18 16:32


Morphine Sulfate (Morphine)  4 mg IVP STAT STA


   Stop: 01/15/18 14:00


   Last Admin: 01/15/18 14:58 Dose:  Not Given


   Non-Admin Reason: Patient Refused





Potassium Chloride (K-Dur 20 Meq Er Tab)  20 meq PO STAT STA


   Stop: 01/15/18 15:11











- PA / NP / Resident Statement


MD/ has reviewed & agrees with the documentation as recorded.





Disposition/Present on Arrival





- Present on Arrival


Any Indicators Present on Arrival: No


History of DVT/PE: No


History of Uncontrolled Diabetes: No


Urinary Catheter: No


History of Decub. Ulcer: No


History Surgical Site Infection Following: None





- Disposition


Have Diagnosis and Disposition been Completed?: Yes


Diagnosis: 


 Poorly controlled diabetes mellitus, Cellulitis in diabetic foot, 

Hyperglycemia without ketosis, Hypokalemia, CHF (congestive heart failure)





Disposition: HOSPITALIZED


Disposition Time: 16:04


Patient Plan: Admission


Patient Problems: 


 Current Active Problems











Problem Status Onset


 


CHF (congestive heart failure) Acute  


 


Cellulitis in diabetic foot Acute  


 


Hyperglycemia without ketosis Acute  


 


Hypokalemia Acute  


 


Poorly controlled diabetes mellitus Acute  











Condition: STABLE


Discharge Instructions (ExitCare):  Heart Failure (ED), Cellulitis (ED)


Referrals: 


Jair Castañeda DO [Primary Care Provider] - Follow up with primary


Forms:  [employer] (English)

## 2018-01-16 LAB
ALBUMIN SERPL-MCNC: 2.9 [, G/DL] (ref 3–4.8)
ALBUMIN/GLOB SERPL: 0.8 [,] (ref 1.1–1.8)
ALT SERPL-CCNC: 23 [, U/L] (ref 7–56)
AST SERPL-CCNC: 14 [, U/L] (ref 17–59)
BUN SERPL-MCNC: 14 [, MG/DL] (ref 7–21)
CALCIUM SERPL-MCNC: 8.5 [, MG/DL] (ref 8.4–10.5)
ERYTHROCYTE [DISTWIDTH] IN BLOOD BY AUTOMATED COUNT: 14.6 [, %] (ref 11.5–14.5)
GFR NON-AFRICAN AMERICAN: 43 [,]
HGB BLD-MCNC: 14.2 [, G/DL] (ref 14–18)
MCH RBC QN AUTO: 24.8 [, PG] (ref 25–35)
MCHC RBC AUTO-ENTMCNC: 32.6 [, G/DL] (ref 31–37)
MCV RBC AUTO: 76 [, FL] (ref 80–105)
PLATELET # BLD: 330 [, 10^3/UL] (ref 120–450)
PMV BLD AUTO: 9.2 [, FL] (ref 7–11)
RBC # BLD AUTO: 5.72 [, 10^6/UL] (ref 3.5–6.1)
WBC # BLD AUTO: 11.6 [, 10^3/UL] (ref 4.5–11)

## 2018-01-16 RX ADMIN — INSULIN LISPRO SCH UNITS: 100 INJECTION, SOLUTION INTRAVENOUS; SUBCUTANEOUS at 17:11

## 2018-01-16 RX ADMIN — INSULIN DETEMIR SCH UNIT: 100 INJECTION, SOLUTION SUBCUTANEOUS at 22:35

## 2018-01-16 RX ADMIN — POTASSIUM CHLORIDE SCH MEQ: 20 TABLET, EXTENDED RELEASE ORAL at 10:12

## 2018-01-16 RX ADMIN — INSULIN HUMAN SCH UNITS: 100 INJECTION, SOLUTION PARENTERAL at 08:08

## 2018-01-16 RX ADMIN — INSULIN HUMAN SCH UNITS: 100 INJECTION, SOLUTION PARENTERAL at 11:32

## 2018-01-16 RX ADMIN — PIPERACILLIN AND TAZOBACTAM SCH MLS/HR: 3; .375 INJECTION, POWDER, LYOPHILIZED, FOR SOLUTION INTRAVENOUS; PARENTERAL at 02:09

## 2018-01-16 RX ADMIN — INSULIN LISPRO SCH UNITS: 100 INJECTION, SOLUTION INTRAVENOUS; SUBCUTANEOUS at 08:07

## 2018-01-16 RX ADMIN — INSULIN HUMAN SCH: 100 INJECTION, SOLUTION PARENTERAL at 21:31

## 2018-01-16 RX ADMIN — INSULIN HUMAN SCH UNITS: 100 INJECTION, SOLUTION PARENTERAL at 17:12

## 2018-01-16 RX ADMIN — INSULIN LISPRO SCH UNITS: 100 INJECTION, SOLUTION INTRAVENOUS; SUBCUTANEOUS at 11:30

## 2018-01-16 RX ADMIN — LINEZOLID SCH MLS/HR: 600 INJECTION, SOLUTION INTRAVENOUS at 13:31

## 2018-01-16 NOTE — CON
DATE:



ENDOCRINOLOGY CONSULTATION



LOCATION:  In room _____



HISTORY OF PRESENT ILLNESS:  This is a 47-year-old male with known history

of type 2 insulin-requiring diabetes, presenting here with a left heel

neuropathic ulceration and associated cellulitis and is now being referred

for diabetic evaluation and management.



PAST MEDICAL HISTORY:  As mentioned above, history of type 2

insulin-requiring diabetes, using a combination of Levemir and Humalog

insulin therapy as noted; history of hypertension and dyslipidemia; history

of coronary artery disease with previous coronary stent placement, history

of diabetic retinopathy, polyneuropathy, _____ with underlying

vasculopathy.



FAMILY HISTORY:  Positive for diabetes and hypertension.



SOCIAL HISTORY:  The patient has supportive family.  No substance use.



REVIEW OF SYSTEMS:  Admits to episodic bouts of dizziness and

lightheadedness with suboptimal energy level and easy fatigability and

tiredness.  No chest pains.  No palpitations.  No PND.  His oral intake is

variable with occasional dyspepsia and constipation.  Also, admits to

recent nocturia and polyuria as noted.



PHYSICAL EXAMINATION:

GENERAL:  This is an obese male, in no apparent distress.

VITAL SIGNS:  Blood pressure 150/90, pulse of 76 beats per minute and

regular, temperature 98, respirations 20, height is 5 feet and weight is

260 pounds.

HEENT:  Head normocephalic.  Eyes anicteric with pink conjunctivae. 

Funduscopy not possible at this time.  Ears, nose and throat otherwise

normal.

NECK:  Supple.  Thyroid gland is normal in size.  No carotid bruits or

cervical adenopathy.

CARDIOPULMONARY:  Adynamic precordium.  S1, S2 is rapid and regular.

LUNGS:  Clear to auscultation.

ABDOMEN:  Flat, soft with positive bowel sounds.

EXTREMITIES:  No peripheral edema.  Pulses are +2 bilaterally.



LABORATORY DATA:  Chemistry showed BUN of 15, sodium 134, potassium 3.1,

chloride 96, CO2 30, glucose 503, and creatinine 1.7.  His glucose levels

are ranging from 296 to 386 mg/dL.



ASSESSMENT:  This is a 47-year-old male with uncontrolled and decompensated

type 2 insulin-requiring diabetes with marked hyperglycemic accelerations,

presenting here with healed neuropathic ulceration and associated

cellulitis as noted thereof.  He also has diabetic microvascular

complications of retinopathy, polyneuropathy, and early nephropathy. 

Moreover, he has diabetic macrovascular complications of coronary artery

disease and peripheral arterial disease and vasculopathy.



PLAN OF MANAGEMENT:  We will continue the current basal and bolus insulin

regimen, which is more physiologic as ordered and keep on Levemir given as

30 units subcu at bedtime daily as ordered.  We will also continue with

Humalog given as 12 units subcu t.i.d. before meals as given.  We will

titrate incrementally as indicated to optimize metabolic control.  We will

also continue the low-dose correction scale using Humalog insulin as given.

We will obtain hemoglobin A1c to confirm his prior glycemic control and

baseline lipid panels and thyroid studies will be ordered.  We will obtain

serial chemistries and supplement accordingly as needed.  We will follow up

with you.











__________________________________________

Leti Vincent MD





DD:  01/16/2018 1:25:17

DT:  01/16/2018 4:05:45

Job # [id number]

## 2018-01-16 NOTE — PN
DATE:



SUBJECTIVE:  I saw Irvin sitting out of bed to chair.  He is getting

ready to eat breakfast.  He is here with an infected left heel ulcer.  He

has had some drainage last night.  Does have CHF, diabetes, high blood

sugars and obesity.  He is comfortable.  Awaiting for Infectious Disease,

Podiatry and Endocrinology to see him.  He is on aspirin, Bactroban cream,

Glucophage, insulin, potassium replacement, Lasix, Levemir, Lipitor,

Lopressor, morphine, Norvasc, Plavix, vancomycin and Zosyn.



PHYSICAL EXAMINATION:

VITAL SIGNS:  98.7 temperature, 82 pulse, 152/85 blood pressure, 18

respiratory rate, and 100% O2 saturation on room air.

HEENT:  His head is atraumatic and normocephalic.

HEART:  Regular rate.

LUNGS:  Clear to auscultation.

ABDOMEN:  Soft, obese, and nontender.

EXTREMITIES:  Less edema with the IV Lasix and made a big difference in the

heel as well and also oozing.



LABORATORY DATA:  He has a 11.6 white count, 14.2 hemoglobin, 43.5

hematocrit with 330 platelets.  He has 141 sodium, potassium is 3.1 and

replaced the potassium, BUN is 14, and creatinine is 1.7.  GFR is 43, sugar

is 171, calcium is 8.5, total bilirubin is 0.4, AST is 14, ALT is 23, and

alkaline phosphatase is 95.  Total protein is 6.6.



ASSESSMENT AND PLAN:  We will continue with aggressive treatment and care. 

Awaiting for Podiatry, Infectious Disease and Endocrinology to see him.  We

are going to replace the potassium.  Continue with aggressive treatment and

care.  I am not sure why the Zosyn fell off, I renewed the Zosyn.







__________________________________________

Jair Castañeda DO





DD:  01/16/2018 8:59:53

DT:  01/16/2018 9:48:23

Job # [id number]

## 2018-01-16 NOTE — CARD
--------------- APPROVED REPORT --------------





EKG Measurement

Heart [id number]

CA [id number]

[id number]

[id number]

[id number]



<Conclusion>

Normal sinus rhythm

Nonspecific T wave abnormality

Prolonged QT

Abnormal ECG

## 2018-01-16 NOTE — HP
HISTORY OF PRESENT ILLNESS:  I saw Irvin in the emergency room at

[facility].  He comes in with left foot heel wound, ulcer,

ruptured blister for two weeks, brought a new pair of sneakers a couple of

months ago, started wearing them, that is when the blister began and now it

is ruptured, foul smelling, chills for the past few days, could not get to

the primary doctor.  No numbness or tingling.



PAST MEDICAL HISTORY:  Including hypertension, high cholesterol, diabetes,

morbidly obese, noncompliance, CHF with edema.  He had two heart attacks in

the past, cardiac stents x3.  He had periorbital cellulitis at one time.



FAMILY HISTORY:  Hypertension in the family.



SOCIAL HISTORY:  Former smoker.  No alcohol, no drugs.



ALLERGIES:  NO KNOWN DRUG ALLERGIES.



MEDICATIONS:  He is on Glucophage, aspirin, Lipitor, Plavix, Lasix,

Lopressor, Norvasc, insulin.



REVIEW OF SYSTEMS:  No acute vision or hearing changes.  He had chills.  No

shortness of breath or cough.  No chest pain or palpitations.  No abdominal

pain, diarrhea, nausea, vomiting, or constipation.  No arthralgias or back

pain.  He has rash, skin lesion, cellulitis on his left heel with a blister

at first.  No headache or dizziness.  No anxiety or depression.



PHYSICAL EXAMINATION:

GENERAL:  He is well-appearing, nontoxic, in moderate distress.  Alert and

oriented x3.

VITAL SIGNS:  He has a 98 temperature, 92 pulse, 18 respiratory rate,

150/92 blood pressure, 97% O2 sat on room air.

HEENT:  Head is atraumatic and normocephalic.  Extraocular muscles are

intact.  Pupils are equal and reactive to light and accommodation.  Throat

is moist.

NECK:  Supple.

HEART:  Regular rate.  Normal S1, S2.

LUNGS:  Clear to auscultation bilaterally with decreased breath sounds.

ABDOMEN:  Soft, nontender, positive bowel sounds.  Morbidly obese.  No

guarding.  No rebound.  No CVA tenderness.

EXTREMITIES:  He has got +2/4 pitting edema in bilateral lower extremities.

He has got a left heel ulcer, 4 cm x 2 cm foul smelling wound; it looks

infected.

NEUROLOGIC:  GCS of 15.  Cranial nerves II through XII grossly intact. 

Speech is normal.  Alert and oriented x3.

SKIN:  Warm and dry except for the left heel.

LYMPHATICS:  Thyroid midline.  No palpable lymphadenopathy.



LABORATORY DATA:  He had some tests done.  He had a chest x-ray, which is

okay.  X-ray which is okay of the foot.  Ultrasound is pending.  He had lab

tests with 134 sodium, potassium of 3.1; we are going to replace the

potassium.  He has a BUN of 15 and creatinine 1.7, we will watch his kidney

function.  GFR is 43.  His blood sugars are 543 and 396.  I called in

Endocrinology.  Calcium is 8.2, magnesium 1.9, total bili is 0.4, AST is

15, ALT is 15, alkaline phosphatase of 107.  BNP was 4000 Lasix IV. 

Total protein 6.9, lactate is 2.  His blood sugar is 559 on   White

count is a bit up at 11.3, hemoglobin 14.6, hematocrit 44, and platelets of

302.



IMPRESSION:  He is here for elevated blood sugars.  He has got an infected

left heel ulcer in a diabetic who is morbidly obese with congestive heart

failure and edema.



PLAN:  We are going to call in Infectious Disease, Podiatry, Endocrinology.

He will be on IV Zosyn, got a dose of vancomycin, gave Lasix IV. 

Hopefully, he will be well.





__________________________________________

Jair Castañeda DO



DD:  01/15/2018 17:49:14

DT:  01/16/2018 0:28:00

Job # [id number]



MTDD

## 2018-01-17 LAB
ALBUMIN SERPL-MCNC: 2.8 [, G/DL] (ref 3–4.8)
ALBUMIN/GLOB SERPL: 0.8 [,] (ref 1.1–1.8)
ALT SERPL-CCNC: 17 [, U/L] (ref 7–56)
AST SERPL-CCNC: 14 [, U/L] (ref 17–59)
BUN SERPL-MCNC: 17 [, MG/DL] (ref 7–21)
CALCIUM SERPL-MCNC: 8.2 [, MG/DL] (ref 8.4–10.5)
ERYTHROCYTE [DISTWIDTH] IN BLOOD BY AUTOMATED COUNT: 14.8 [, %] (ref 11.5–14.5)
GFR NON-AFRICAN AMERICAN: 32 [,]
HGB BLD-MCNC: 14 [, G/DL] (ref 14–18)
MCH RBC QN AUTO: 24.6 [, PG] (ref 25–35)
MCHC RBC AUTO-ENTMCNC: 32 [, G/DL] (ref 31–37)
MCV RBC AUTO: 76.7 [, FL] (ref 80–105)
PLATELET # BLD: 307 [, 10^3/UL] (ref 120–450)
PMV BLD AUTO: 9 [, FL] (ref 7–11)
RBC # BLD AUTO: 5.7 [, 10^6/UL] (ref 3.5–6.1)
WBC # BLD AUTO: 10.7 [, 10^3/UL] (ref 4.5–11)

## 2018-01-17 RX ADMIN — INSULIN LISPRO SCH UNITS: 100 INJECTION, SOLUTION INTRAVENOUS; SUBCUTANEOUS at 09:26

## 2018-01-17 RX ADMIN — INSULIN DETEMIR SCH UNIT: 100 INJECTION, SOLUTION SUBCUTANEOUS at 22:01

## 2018-01-17 RX ADMIN — POTASSIUM CHLORIDE SCH MEQ: 20 TABLET, EXTENDED RELEASE ORAL at 09:44

## 2018-01-17 RX ADMIN — INSULIN LISPRO SCH: 100 INJECTION, SOLUTION INTRAVENOUS; SUBCUTANEOUS at 17:25

## 2018-01-17 RX ADMIN — COLLAGENASE SANTYL SCH: 250 OINTMENT TOPICAL at 09:47

## 2018-01-17 RX ADMIN — INSULIN LISPRO SCH: 100 INJECTION, SOLUTION INTRAVENOUS; SUBCUTANEOUS at 17:38

## 2018-01-17 RX ADMIN — INSULIN LISPRO SCH: 100 INJECTION, SOLUTION INTRAVENOUS; SUBCUTANEOUS at 21:57

## 2018-01-17 RX ADMIN — INSULIN LISPRO SCH UNITS: 100 INJECTION, SOLUTION INTRAVENOUS; SUBCUTANEOUS at 11:53

## 2018-01-17 RX ADMIN — INSULIN LISPRO SCH: 100 INJECTION, SOLUTION INTRAVENOUS; SUBCUTANEOUS at 11:54

## 2018-01-17 RX ADMIN — INSULIN LISPRO SCH: 100 INJECTION, SOLUTION INTRAVENOUS; SUBCUTANEOUS at 09:27

## 2018-01-17 RX ADMIN — LINEZOLID SCH MLS/HR: 600 INJECTION, SOLUTION INTRAVENOUS at 09:41

## 2018-01-17 RX ADMIN — LINEZOLID SCH MLS/HR: 600 INJECTION, SOLUTION INTRAVENOUS at 22:01

## 2018-01-17 NOTE — CP.PCM.CON
History of Present Illness





- History of Present Illness


History of Present Illness: 


46 y/o male with PMHx of DM, HLD, HTN, morbid obesity, CHF seen at bedside with 

attending Dr. Pineda for let heel ulceration. Pt says he had the arterial 

studies and MRI done today. Pt has been receiving IV antibiotics. Denies F/C/N/V

/CP/SOB. Denies pain to the left heel wound. States his dressing has remained 

clean, dry and intact.








Review of Systems





- Review of Systems


All systems: reviewed and no additional remarkable complaints except (per HPI)





Past Patient History





- Infectious Disease


Hx of Infectious Diseases: None





- Tetanus Immunizations


Tetanus Immunization: Unknown





- Past Medical History & Family History


Past Medical History?: No





- Past Social History


Smoking Status: Never Smoked





- CARDIAC


Hx Cardiac Disorders: Yes


Hx Congestive Heart Failure: Yes


Hx Hypercholesterolemia: Yes


Hx Hypertension: Yes





- PULMONARY


Hx Respiratory Disorders: No





- NEUROLOGICAL


Hx Neurological Disorder: Yes (STATES HAD" A STROKE" NO RESIDUAL)


Hx Dizziness: Yes





- HEENT


Hx HEENT Problems: No





- RENAL


Hx Chronic Kidney Disease: No





- ENDOCRINE/METABOLIC


Hx Diabetes Mellitus Type 2: Yes





- HEMATOLOGICAL/ONCOLOGICAL


Hx Blood Disorders: No





- INTEGUMENTARY


Hx Dermatological Problems: No





- MUSCULOSKELETAL/RHEUMATOLOGICAL


Hx Musculoskeletal Disorders: No


Hx Falls: No





- GASTROINTESTINAL


Hx Gastrointestinal Disorders: No





- GENITOURINARY/GYNECOLOGICAL


Hx Genitourinary Disorders: No





- PSYCHIATRIC


Hx Psychophysiologic Disorder: No





- SURGICAL HISTORY


Hx Surgeries: Yes (CARD STENTS X 3,)


Hx Coronary Stent: Yes (X3)





- ANESTHESIA


Hx Anesthesia: Yes


Hx Anesthesia Reactions: No


Hx Malignant Hyperthermia: No





Meds


Allergies/Adverse Reactions: 


 Allergies











Allergy/AdvReac Type Severity Reaction Status Date / Time


 


No Known Allergies Allergy   Verified 07/12/17 15:13














- Medications


Medications: 


 Current Medications





Amlodipine Besylate (Norvasc)  2.5 mg PO DAILY [facility]


   Last Admin: 01/17/18 09:46 Dose:  2.5 mg


Aspirin (Aspirin Chewable)  81 mg PO DAILY [facility]


   Last Admin: 01/17/18 09:43 Dose:  81 mg


Atorvastatin Calcium (Lipitor)  40 mg PO DAILY [facility]


   Last Admin: 01/17/18 09:45 Dose:  40 mg


Clopidogrel Bisulfate (Plavix)  75 mg PO DAILY [facility]


   Last Admin: 01/17/18 09:46 Dose:  75 mg


Collagenase (Santyl)  0 gm TOP DAILY [facility]


   Last Admin: 01/17/18 09:47 Dose:  Not Given


Furosemide (Lasix)  40 mg IVP DAILY [facility]


   Last Admin: 01/17/18 09:44 Dose:  40 mg


Linezolid (Zyvox 600mg/300ml D5w)  600 mg in 300 mls @ 200 mls/hr IVPB Q12 [facility]


   PRN Reason: Protocol


   Stop: 01/26/18 13:05


   Last Admin: 01/17/18 09:41 Dose:  200 mls/hr


Insulin Detemir (Levemir)  36 unit SC HS [facility]


Insulin Human Lispro (Humalog Low)  0 units SC ACHS [facility]


   PRN Reason: Protocol


   Last Admin: 01/17/18 17:25 Dose:  Not Given


Insulin Human Lispro (Humalog)  14 units SC AC [facility]


Metformin HCl (Glucophage)  500 mg PO BID [facility]


   Last Admin: 01/17/18 17:26 Dose:  500 mg


Metoprolol Tartrate (Lopressor)  25 mg PO DAILY [facility]


   Last Admin: 01/17/18 09:45 Dose:  25 mg


Mupirocin (Bactroban Ointment)  0 gm TOP BID [facility]


   Last Admin: 01/17/18 17:25 Dose:  1 applic


Potassium Chloride (K-Dur 20 Meq Er Tab)  20 meq PO DAILY [facility]


   Last Admin: 01/17/18 09:44 Dose:  20 meq











Physical Exam





- Constitutional


Appears: Well, Non-toxic, No Acute Distress





- Extremities Exam


Additional comments: 


Left lower extremity exam:


Vasc: DP/PT pulses faintly palpable 1/4. Temperature gradient is warm to cool. 

CFT < 4 sec to all digits. No pedal hair growth noted


Derm: Left heel full thickness ulceration measuring approx 2.9 cm x 1.6cm x 

0.3cm with mixed fibrous and necrotic wound bed. No active drainage, malodor, 

fluctuance, tunneling or undermining. Wound does not probe to bone. No olimpia 

wound erythema present. 


Neuro: Protective sensation grossly diminished


Ortho: Wound to left heel non tender to palpation








- Neurological Exam


Neurological exam: Alert, Oriented x3





- Psychiatric Exam


Psychiatric exam: Normal Affect, Normal Mood





Results





- Vital Signs


Recent Vital Signs: 


 Last Vital Signs











Temp  98.4 F   01/17/18 09:09


 


Pulse  89   01/17/18 09:45


 


Resp  20   01/17/18 09:09


 


BP  137/91 H  01/17/18 09:46


 


Pulse Ox  96   01/17/18 09:09














- Labs


Result Diagrams: 


 01/17/18 06:30





 01/17/18 06:30


Labs: 


 Laboratory Results - last 24 hr











  01/16/18 01/17/18 01/17/18





  21:26 06:30 06:30


 


WBC   10.7 


 


RBC   5.70 


 


Hgb   14.0 


 


Hct   43.7 


 


MCV   76.7 L 


 


MCH   24.6 L 


 


MCHC   32.0 


 


RDW   14.8 H 


 


Plt Count   307 


 


MPV   9.0 


 


Sodium    136


 


Potassium    2.9 L*


 


Chloride    100


 


Carbon Dioxide    29


 


Anion Gap    10


 


BUN    17


 


Creatinine    2.2 H


 


Est GFR ( Amer)    39


 


Est GFR (Non-Af Amer)    32


 


POC Glucose (mg/dL)  159 H  


 


Random Glucose    204 H


 


Calcium    8.2 L


 


Total Bilirubin    0.3


 


AST    14 L


 


ALT    17


 


Alkaline Phosphatase    87


 


Total Protein    6.3


 


Albumin    2.8 L


 


Globulin    3.5


 


Albumin/Globulin Ratio    0.8 L














  01/17/18 01/17/18 01/17/18





  07:39 11:50 16:52


 


WBC   


 


RBC   


 


Hgb   


 


Hct   


 


MCV   


 


MCH   


 


MCHC   


 


RDW   


 


Plt Count   


 


MPV   


 


Sodium   


 


Potassium   


 


Chloride   


 


Carbon Dioxide   


 


Anion Gap   


 


BUN   


 


Creatinine   


 


Est GFR ( Amer)   


 


Est GFR (Non-Af Amer)   


 


POC Glucose (mg/dL)  182 H  294 H  147 H


 


Random Glucose   


 


Calcium   


 


Total Bilirubin   


 


AST   


 


ALT   


 


Alkaline Phosphatase   


 


Total Protein   


 


Albumin   


 


Globulin   


 


Albumin/Globulin Ratio   














Assessment & Plan





- Assessment and Plan (Free Text)


Assessment: 





46 y/o diabetic male with left heel ulceration secondary to diabetes


Plan: 


Pt seen and evaluated with attending Dr. Pineda


Labs and vitals reviewed- afebrile, WBC 10.7


L foot x-rays and MRI of LLE (-) for osteomyelitis


Wound cx (+) for MRSA


Arterial duplex studies performed today, results pending


Vascular consult Dr. Roman placed, await recommendations


Wound cleansed with saline and dressed with Santyl and DSD


Continue Zyvox per ID


Pt educated on smoking cessation and the impact of diabetes and smoking on his 

healing potential and on his circulation


Podiatry will continue to follow while in house

## 2018-01-17 NOTE — CON
DATE:



HISTORY OF PRESENT ILLNESS:  A 47-year-old male seen at bedside for

consultation, evaluation, and management of a recent diabetic left heel

ulceration.  The patient states that he believes that he got it from

wearing a new pair of sneakers couple with the fact that he was shoveling

snow when it snowed two weeks ago which caused a subsequent blister and due

to his neuropathy, he did not feel it and it developed into an infected

diabetic heel ulceration.



PAST MEDICAL HISTORY:  The patient's medical history is significant for

longstanding insulin-dependent diabetes, hypercholesterolemia, essential

hypertension, morbid obesity, CHF with edema.  He has a long history of

diabetic noncompliance.



PAST SURGICAL HISTORY:  Three cardiac stent placements subsequent to two

MIs.



FAMILY HISTORY:  Significant for hypertension.



SOCIAL HISTORY:  The patient denies illicit drug use.  Does not drink

alcohol, but is a former smoker.



ALLERGIES:  THE PATIENT HAS NO KNOWN DRUG ALLERGIES.



HOME MEDICATIONS:  Include Plavix, Lasix, insulin, Norvasc, Lopressor,

aspirin, and Glucophage.



IMAGING STUDIES:  Foot x-rays taken revealed no cortical erosion to suggest

osteomyelitis.  Venous Doppler results reveal no radiographic evidence of

deep vein thrombosis.  Wound culture taken of the left heel reveals

Staphylococcus aureus growth.



LABORATORY FINDINGS:  Reveal a white count of 11.6, hemoglobin of 14.2,

hematocrit of 43.5, platelet count of 330.



OBJECTIVE:

VITAL SIGNS:  Revealed a temperature of 98.7, pulse rate of 83, blood

pressure of 134/79, respiratory rate of 20.

EXTREMITIES:  Nonpalpable pedal pulses noted bilaterally.  Absent pedal

hair growth noted bilaterally.  Lower extremity skin presents thin,

shining, discolored bilaterally.  There is a lack of pedal hair growth

noted bilaterally.  Capillary filling time is delayed x10.  The patient is

unable to detect 5.07 gm monofilament wire testing bilaterally.  Left heel

presents with a full-thickness ulceration in a teardrop shape that measures

approximately 3 cm x 1.8 cm x 0.3 cm.  The base of the ulceration is

primarily gangrenous.  There is no purulence noted at this time.  There is

no malodor.  The wound does not probe to tendon or bone.  There is no

flatulence to suggest underlying abscess formation.  There is no pain

elicited upon palpation.  There is noted to be trauma to the right first

and second digits with subungual hematomas noted.



ASSESSMENT:  Full-thickness diabetic left heel ulceration, osteomyelitis

must be ruled out.



PLAN:  New culture was taken and submitted for sensitivities.  The wound

was cleansed with normal sterile saline and application of Betadine and a

dry sterile dressing was applied.  Santyl ointment will be ordered as an

enzymatic debriding agent to be applied to the wound daily.  We will order

MRIs to rule out underlying abscess formation as the wound is deep and

x-rays are non-conclusive for osteomyelitis.  We will order a reverse

HeelWedge shoe, so the patient will be able to ambulate while offloading

his heel.  Arterial Dopplers will be ordered to ascertain lower extremity

perfusion and a consult for Dr. Hoang Roman will be placed.  Infectious

disease consult requested to evaluate culture and sensitivity results and

prescribe accordingly.  The patient will be seen and followed daily.





__________________________________________

Taqueria Avalos DPM





DD:  01/16/2018 15:06:05

DT:  01/16/2018 15:10:55

Job # [id number]

## 2018-01-17 NOTE — MRI
PROCEDURE:  MRI of the left foot without contrast



HISTORY:

r/o osteo



COMPARISON:





TECHNIQUE:

MRI of the left foot was performed in multiple planes using multiple 

pulse sequences.



FINDINGS:

There is edema and swelling over the dorsum of the foot. This could 

be due to passive edema or cellulitis.



There is no marrow edema to suggest osteomyelitis. Specifically there 

are no abnormalities seen in the calcaneus.



No evidence of tendon or ligament tear.



IMPRESSION:

No evidence of osteomyelitis

## 2018-01-17 NOTE — CON
DATE:  01/16/2018



LOCATION:  The patient is seen in room 568, bed 1.



CHIEF COMPLAINT:  Foot ulcer and foot infection times several days.



HISTORY OF PRESENT ILLNESS:  The patient is a 47-year-old male with past

medical history significant for hypertension, hyperlipidemia, diabetes was

admitted with a left foot heel ulcer which started with a blister and

became ruptured in 2 weeks.  He is blaming it on a ill-fitting sneakers. 

There has been no fevers, no chills.  No nausea, no vomiting.  He denies

any abdominal pain, diarrhea or constipation.  No dysuria or frequency.



PAST MEDICAL HISTORY:  Significant for diabetes mellitus, coronary artery

disease, hypertension, congestive heart failure, myocardial infarction x2,

peripheral vascular disease, periorbital cellulitis, urinary tract

infection and bronchitis.



PAST SURGICAL HISTORY:  Significant for cardiac cath and stent placement.



SOCIAL HISTORY:  The patient is a long-time smoker, recently just quit

smoking.



ALLERGIES:   THE PATIENT HAS NO KNOWN ALLERGIES.



HOME MEDICATIONS:  Include amlodipine, metoprolol, metformin, meclizine,

insulin, furosemide, clopidogrel, atorvastatin, aspirin.



PHYSICAL EXAMINATION:

VITAL SIGNS:  The patient is in bed with a temperature of 98, heart rate of

92, blood pressure is 150/80, respiratory rate of 20.

HEENT:  Unremarkable.

NECK:  Supple.

LUNGS:  Decreased breath sounds.

HEART:  Normal S1, S2.

ABDOMEN:  Soft, nontender.  No rebound or guarding.

EXTREMITIES:  Examination of the left foot, there is a left heel ulcer,

open ulcer, it appears to be clean.  Minimal discharge.  No erythema and no

evidence of active infection.  The left fifth toe is dry, gangrene bottom

of the toe.



LABORATORY DATA:  Reveals a white count of 11,300, hemoglobin of 14,

platelets of 302, 76% granulocytosis.  Chemistries reveals a BUN of 14,

creatinine of 1.7.  The last renal function in July was 1.3, prior to that

the patient in 2015 had been having 1.4 and 1.5.  The patient's foot from

the abscess that was drained is a staph aureus.  Blood cultures are

pending.  The patient was given a dose of vancomycin and dose of Zosyn.



ASSESSMENT AND PLAN:  A 47-year-old male with hypertension, diabetes,

coronary artery disease, peripheral vascular disease, congestive heart

failure, myocardial infarction with a left heel ulcer and cellulitis with

staphylococcus aureus and pending sensitivity with a left fifth toe

gangrene with acute kidney injury creatinine has gone from 1.3 to 1.7.  We

will start the patient on Zyvox 600 mg IV q. 12. and we will check on the

sensitivity of the staphylococcus aureus and should have imaging to rule

out underlying osteomyelitis and vascular consultation regarding worsening

of the peripheral vascular disease and podiatric consultation for local

wound care.  The patient did have an x-ray of the foot, which was normal. 

Should have either MRI if possible without contrast because of the

underlying renal disease in a morbidly obese patient with a body mass index

of 42 or a bone scan to rule out underlying osteomyelitis..  We will follow

with you.  We will order a left foot MRI without contrast to rule out

osteo.  We will make further recommendations..







__________________________________________

Damon Henley MD





DD:  01/16/2018 13:07:18

DT:  01/16/2018 13:16:31

Job # [id number]

## 2018-01-17 NOTE — US
PROCEDURE:  Lower extremity SUMIT exam



HISTORY:

Peripheral vascular disease with pain and ulceration. Smoker.  

Diabetes. 



PHYSICIAN(S):  Hoang Roman MD.



FINDINGS:

The resting SUMIT's are moderately abnormal: Right, 0.64 and left, 0.62 



The brachial systolic pressures are symmetric.



The high thigh pressures and waveforms are relatively normal.



The right calf PVR waveform augments normally.  The left calf PVR 

waveform does not augment. There appear to be significant gradients 

across both thighs. This could represent bilateral SFA disease. 



There is a 31 mm gradient across the right knee. There is a 25 mm 

gradient across the left knee. The right ankle PVR waveform is mildly 

blunted.  The left ankle PVR waveform is moderately blunted. 



IMPRESSION:

1. Moderately abnormal ABIs at rest. 



2. Bilateral trifurcation and/ or tibial disease. 



3. Possible bilateral SFA disease, greater on the left than the 

right.

## 2018-01-17 NOTE — PN
DATE:  01/17/2018



SUBJECTIVE:  The patient is in bed, in no acute distress, and nontoxic.



PHYSICAL EXAMINATION:

VITAL SIGNS:  Temperature is 98, blood pressure is 130/70, and respiratory

rate is 16.

HEENT:  Unremarkable.

NECK:  Supple.

LUNGS:  Decreased breath sounds.

HEART:  Normal S1 and S2.

ABDOMEN:  Soft.



LABORATORY DATA:  Reveals a white count of 10,000, hemoglobin of 14, and

platelets of 307.  Chemistries are noted with a BUN of 17 and creatinine of

2.2.  Microbiology reveals a MRSA on the wound and the abscess.  The blood

cultures are negative.  _____ MRSA to vancomycin is warned.



ASSESSMENT AND PLAN:  This is a 47-year-old with hypertension, diabetes,

coronary artery disease, peripheral vascular disease, congestive heart

failure, myocardial infarction, left heel ulcer and cellulitis with

methicillin-resistant Staphylococcus aureus.  The patient with MRI of the

foot done today, no evidence of osteomyelitis.  We can switch _____ p.o.

Zyvox to complete seven days of antibiotics.  Dr. Avalos's consult is

reviewed and Dr. Castañeda's progress note is reviewed.  We will follow with

you.





__________________________________________

Damon Henley MD





DD:  01/17/2018 17:32:48

DT:  01/17/2018 19:18:58

Job # [id number]

## 2018-01-17 NOTE — PN
DATE:



SUBJECTIVE:  I saw him sitting up in bed.  He wants to go home even though

we discussed the importance of his left heel ulcer, cellulitis, left fifth

toe gangrene, IV antibiotics, acute kidney injury and CHF.  He has

peripheral vascular disease, he has coronary artery disease and he has

history of MI, hypertension and diabetes and his low potassium, which we

had to replace and that we called in Peripheral Vascular doctor to take a

look at the circulation.  So, I am not ready to discharge him.  He is on

aspirin, Bactroban cream, Glucophage, potassium replacement, Lasix IV,

Levemir, Lipitor, Lopressor, Norvasc, Plavix and Santyl.



PHYSICAL EXAMINATION

VITAL SIGNS:  He has 98.7 temperature, 83 pulse, 134/79 blood pressure, 20,

respiratory rate, and 97% O2 saturation on room air.

HEENT:  His head is atraumatic, normocephalic.  Throat is moist.

NECK:  Supple.

HEART:  Regular rate.

LUNGS:  Decreased breath sounds, but clear.

ABDOMEN:  Soft and morbidly obese.

EXTREMITIES:  Left foot is bandaged.  He has got edema +2/4, was worse, I

think the IV Lasix is helping him a lot.



MEDICATIONS:  He is on aspirin, Bactroban cream, Glucophage, insulin,

potassium replacement, Lasix IV, Levemir, Lipitor, Lopressor, Norvasc,

Plavix, Santyl, and Zyvox, antibiotics IV.



LABORATORY DATA:  He has a 141 sodium, potassium is 3.1 and it is replaced,

BUN is 14, creatinine is 1.7, little bit worse, little bit   GFR is

43.  Last blood sugar is 159, calcium is 8.5, AST is 14, ALT is 23, and

alkaline phosphatase is 95.  White count is 7.6, hemoglobin is 14.2,

hematocrit is 43.5, and platelets are 330.  I will check this morning's

labs.



ASSESSMENT AND PLAN:  Continue with aggressive treatment and care as per

Podiatry, Infectious Diseases, Endocrinology and await Vascular evaluation.

We will check his labs tomorrow.





__________________________________________

Jair Castañeda DO



DD:  01/17/2018 7:23:52

DT:  01/17/2018 7:26:02

[facility] # [id number]



ROSA

## 2018-01-18 LAB
ALBUMIN SERPL-MCNC: 2.9 [, G/DL] (ref 3–4.8)
ALBUMIN/GLOB SERPL: 0.8 [,] (ref 1.1–1.8)
ALT SERPL-CCNC: 18 [, U/L] (ref 7–56)
AST SERPL-CCNC: 17 [, U/L] (ref 17–59)
BUN SERPL-MCNC: 18 [, MG/DL] (ref 7–21)
CALCIUM SERPL-MCNC: 8.4 [, MG/DL] (ref 8.4–10.5)
ERYTHROCYTE [DISTWIDTH] IN BLOOD BY AUTOMATED COUNT: 14.7 [, %] (ref 11.5–14.5)
GFR NON-AFRICAN AMERICAN: 36 [,]
HGB BLD-MCNC: 13.3 [, G/DL] (ref 14–18)
MCH RBC QN AUTO: 24.8 [, PG] (ref 25–35)
MCHC RBC AUTO-ENTMCNC: 32.3 [, G/DL] (ref 31–37)
MCV RBC AUTO: 76.9 [, FL] (ref 80–105)
PLATELET # BLD: 294 [, 10^3/UL] (ref 120–450)
PMV BLD AUTO: 9 [, FL] (ref 7–11)
RBC # BLD AUTO: 5.36 [, 10^6/UL] (ref 3.5–6.1)
WBC # BLD AUTO: 11.5 [, 10^3/UL] (ref 4.5–11)

## 2018-01-18 RX ADMIN — INSULIN LISPRO SCH UNITS: 100 INJECTION, SOLUTION INTRAVENOUS; SUBCUTANEOUS at 08:24

## 2018-01-18 RX ADMIN — INSULIN LISPRO SCH: 100 INJECTION, SOLUTION INTRAVENOUS; SUBCUTANEOUS at 21:27

## 2018-01-18 RX ADMIN — INSULIN LISPRO SCH: 100 INJECTION, SOLUTION INTRAVENOUS; SUBCUTANEOUS at 17:48

## 2018-01-18 RX ADMIN — COLLAGENASE SANTYL SCH: 250 OINTMENT TOPICAL at 09:40

## 2018-01-18 RX ADMIN — LINEZOLID SCH MLS/HR: 600 INJECTION, SOLUTION INTRAVENOUS at 21:30

## 2018-01-18 RX ADMIN — INSULIN LISPRO SCH: 100 INJECTION, SOLUTION INTRAVENOUS; SUBCUTANEOUS at 12:04

## 2018-01-18 RX ADMIN — INSULIN LISPRO SCH: 100 INJECTION, SOLUTION INTRAVENOUS; SUBCUTANEOUS at 17:47

## 2018-01-18 RX ADMIN — POTASSIUM CHLORIDE SCH MEQ: 20 TABLET, EXTENDED RELEASE ORAL at 09:38

## 2018-01-18 RX ADMIN — INSULIN LISPRO SCH UNITS: 100 INJECTION, SOLUTION INTRAVENOUS; SUBCUTANEOUS at 12:03

## 2018-01-18 RX ADMIN — INSULIN DETEMIR SCH UNIT: 100 INJECTION, SOLUTION SUBCUTANEOUS at 21:31

## 2018-01-18 RX ADMIN — LINEZOLID SCH MLS/HR: 600 INJECTION, SOLUTION INTRAVENOUS at 09:40

## 2018-01-18 RX ADMIN — INSULIN LISPRO SCH: 100 INJECTION, SOLUTION INTRAVENOUS; SUBCUTANEOUS at 08:26

## 2018-01-18 NOTE — CP.PCM.PN
<DamionRachna - Last Filed: 01/18/18 09:59>





Subjective





- Date & Time of Evaluation


Date of Evaluation: 01/18/18


Time of Evaluation: 09:58





- Subjective


Subjective: 


46 y/o male seen at bedside with attending Dr. Pineda for left heel 

ulceration. Pt says he wants to go home today. Denies having any pain in the 

left foot or noticing any drainage through his dressing. He is ambulating in 

regular shoes at time of visit, with reverse wedge boot on floor next to bed. 

Denies F/C/N/V/CP/SOB. Denies noticing any color changes to his left 5th toe. 

Denies trauma to the toe. 








Objective





- Vital Signs/Intake and Output


Vital Signs (last 24 hours): 


 











Temp Pulse Resp BP Pulse Ox


 


 98.3 F   79   20   167/99 H  97 


 


 01/18/18 08:17  01/18/18 09:39  01/18/18 08:17  01/18/18 09:39  01/18/18 08:17








Intake and Output: 


 











 01/18/18 01/18/18





 06:59 18:59


 


Intake Total 820 


 


Balance 820 














- Medications


Medications: 


 Current Medications





Amlodipine Besylate (Norvasc)  2.5 mg PO DAILY [facility]


   Last Admin: 01/18/18 09:39 Dose:  2.5 mg


Aspirin (Aspirin Chewable)  81 mg PO DAILY [facility]


   Last Admin: 01/18/18 09:38 Dose:  81 mg


Atorvastatin Calcium (Lipitor)  40 mg PO DAILY [facility]


   Last Admin: 01/18/18 09:39 Dose:  40 mg


Clopidogrel Bisulfate (Plavix)  75 mg PO DAILY [facility]


   Last Admin: 01/18/18 09:40 Dose:  75 mg


Collagenase (Santyl)  0 gm TOP DAILY [facility]


   Last Admin: 01/18/18 09:40 Dose:  Not Given


Furosemide (Lasix)  40 mg IVP DAILY [facility]


   Last Admin: 01/18/18 09:38 Dose:  40 mg


Linezolid (Zyvox 600mg/300ml D5w)  600 mg in 300 mls @ 200 mls/hr IVPB Q12 [facility]


   PRN Reason: Protocol


   Stop: 01/26/18 13:05


   Last Admin: 01/18/18 09:40 Dose:  200 mls/hr


Insulin Detemir (Levemir)  36 unit SC HS [facility]


   Last Admin: 01/17/18 22:01 Dose:  36 unit


Insulin Human Lispro (Humalog Low)  0 units SC ACHS [facility]


   PRN Reason: Protocol


   Last Admin: 01/18/18 08:26 Dose:  Not Given


Insulin Human Lispro (Humalog)  14 units SC AC [facility]


   Last Admin: 01/18/18 08:24 Dose:  14 units


Metformin HCl (Glucophage)  500 mg PO BID [facility]


   Last Admin: 01/18/18 09:38 Dose:  500 mg


Metoprolol Tartrate (Lopressor)  25 mg PO DAILY [facility]


   Last Admin: 01/18/18 09:39 Dose:  25 mg


Mupirocin (Bactroban Ointment)  0 gm TOP BID [facility]


   Last Admin: 01/18/18 09:38 Dose:  1 applic


Potassium Chloride (K-Dur 20 Meq Er Tab)  20 meq PO DAILY [facility]


   Last Admin: 01/18/18 09:38 Dose:  20 meq











- Labs


Labs: 


 





 01/18/18 07:00 





 01/18/18 07:00 











- Constitutional


Appears: Well, Non-toxic, No Acute Distress





- Extremities Exam


Additional comments: 


Left lower extremity exam:


Vasc: DP/PT pulses faintly palpable 1/4. Temperature gradient is warm to cool. 

CFT < 4 sec to all digits. No pedal hair growth noted. Mild decrease in 

temperature noted to left 5th digit


Derm: Left heel full thickness ulceration measuring approx 2.9 cm x 1.6cm x 

0.3cm with mixed fibrous and necrotic wound bed. No active drainage, malodor, 

fluctuance, tunneling or undermining. Wound does not probe to bone. No olimpia 

wound erythema present. 5th digit exhibits dark red discoloration with blister 

noted to dorsum of MPJ, which remains intact.


Neuro: Protective sensation grossly diminished


Ortho: Wound to left heel non tender to palpation














- Neurological Exam


Neurological Exam: Alert, Awake, Oriented x3





- Psychiatric Exam


Psychiatric exam: Normal Affect, Normal Mood





Assessment and Plan





- Assessment and Plan (Free Text)


Assessment: 


46 y/o diabetic male with 1) left heel ulceration secondary to diabetes with 

peripheral vascular disease and 2) left fifth digit ischemic changes





Plan: 


Pt seen and evaluated with attending Dr. Pineda


Labs and vitals reviewed- afebrile, WBC 11.5


L foot x-rays and MRI of LLE (-) for osteomyelitis


Wound cx (+) for MRSA


Arterial duplex studies reveal abnormal ABIs - consult for Dr. Roman in place, 

recommendations appreciated


Wound cleansed with saline and dressed with Santyl and DSD


Continue Zyvox per ID


Emphasis placed on smoking cessation and the impact on his circulation and 

healing


Pt expressing interest in signing out as he wants to go home today


Insisted patient remain as his lower extremity circulation is compromised and 

he is at risk for potential amputations and limited healing potential if he 

does not get this addressed


Podiatry will continue to follow while in house








<Jacqueline Pineda - Last Filed: 01/19/18 18:03>





Objective





- Vital Signs/Intake and Output


Vital Signs (last 24 hours): 


 











Temp Pulse Resp BP Pulse Ox


 


 98.4 F   82   14   106/104 H  96 


 


 01/19/18 17:36  01/19/18 17:36  01/19/18 17:36  01/19/18 17:36  01/19/18 08:06








Intake and Output: 


 











 01/19/18 01/19/18





 06:59 18:59


 


Intake Total 660 1780


 


Balance 660 1780














- Medications


Medications: 


 Current Medications





Amlodipine Besylate (Norvasc)  2.5 mg PO DAILY [facility]


   Last Admin: 01/19/18 10:01 Dose:  2.5 mg


Aspirin (Aspirin Chewable)  81 mg PO DAILY [facility]


   Last Admin: 01/19/18 09:58 Dose:  81 mg


Atorvastatin Calcium (Lipitor)  40 mg PO DAILY [facility]


   Last Admin: 01/19/18 09:58 Dose:  40 mg


Clopidogrel Bisulfate (Plavix)  75 mg PO DAILY [facility]


   Last Admin: 01/19/18 09:58 Dose:  75 mg


Collagenase (Santyl)  0 gm TOP DAILY [facility]


   Last Admin: 01/19/18 10:03 Dose:  Not Given


Furosemide (Lasix)  40 mg IVP DAILY [facility]


   Last Admin: 01/19/18 10:01 Dose:  40 mg


Linezolid (Zyvox 600mg/300ml D5w)  600 mg in 300 mls @ 200 mls/hr IVPB Q12 [facility]


   PRN Reason: Protocol


   Stop: 01/26/18 13:05


   Last Admin: 01/19/18 10:02 Dose:  200 mls/hr


Sodium Chloride (Sodium Chloride 0.45%)  1,000 mls @ 80 mls/hr IV .[id number] [facility]


   Stop: 01/20/18 10:00


   Last Admin: 01/18/18 23:00 Dose:  80 mls/hr


Insulin Detemir (Levemir)  36 unit SC HS [facility]


   Last Admin: 01/18/18 21:31 Dose:  36 unit


Insulin Human Lispro (Humalog Low)  0 units SC ACHS [facility]


   PRN Reason: Protocol


   Last Admin: 01/19/18 11:30 Dose:  Not Given


Insulin Human Lispro (Humalog)  14 units SC AC [facility]


   Last Admin: 01/19/18 11:53 Dose:  14 units


Metformin HCl (Glucophage)  500 mg PO BID [facility]


   Last Admin: 01/19/18 09:58 Dose:  500 mg


Metoprolol Tartrate (Lopressor)  25 mg PO DAILY [facility]


   Last Admin: 01/19/18 10:01 Dose:  25 mg


Mupirocin (Bactroban Ointment)  0 gm TOP BID [facility]


   Last Admin: 01/19/18 10:05 Dose:  1 applic


Ondansetron HCl (Zofran Inj)  4 mg IVP ONCE [facility]


   PRN Reason: Nausea/Vomiting


   Stop: 01/19/18 23:59


Potassium Chloride (K-Dur 20 Meq Er Tab)  20 meq PO DAILY [facility]


   Last Admin: 01/19/18 09:59 Dose:  20 meq











- Labs


Labs: 


 





 01/19/18 07:20 





 01/19/18 07:20 











Attending/Attestation





- Attestation


I have personally seen and examined this patient.: Yes


I have fully participated in the care of the patient.: Yes


I have reviewed all pertinent clinical information, including history, physical 

exam and plan: Yes

## 2018-01-18 NOTE — PN
DATE:  01/16/2018



ENDOCRINOLOGY FOLLOWUP NOTE



LOCATION:  In room 572.



The previous note was for 01/17/2018, but this one is for 01/16/2018.



SUBJECTIVE:  This is a 47-year-old male with recent uncontrolled type 2

insulin-requiring diabetes, presenting here with left heel cellulitis and

underlying neuropathic ulceration, and is now undergoing IV antibiotic

management and did a local debridement procedure as noted thereof.  He is

being referred and followed also now for metabolic management because of

recent hyperglycemic accelerations as noted thereof.  His latest glucose

levels have ranged from 166 to 174 and 159 mg/dL.  His latest chemistry

showed a BUN of 14, sodium 141, potassium 3.1, chloride 101, CO2 of 32,

glucose 171, and creatinine 1.7.



ASSESSMENT:  This is a 47-year-old male with uncontrolled and decompensated

type 2 insulin-requiring diabetes with diabetic microvascular complications

of retinopathy, polyneuropathy and nephropathy with diabetic macrovascular

complications of coronary artery disease and peripheral arterial disease

and vasculopathy, presenting here with some neuropathic left heel

ulceration as noted.



PLAN OF MANAGEMENT:  We will modify his current basal and bolus insulin

regimen and detailed orders have been given to optimize metabolic control. 

We will increase his Levemir to 30 units subcu at bedtime daily and his

Humalog to 10 units subcu t.i.d. before meals as ordered.  We will continue

the same low-dose correction scale using Humalog insulin as given.  We will

follow and advise accordingly.





__________________________________________

Leti Vincent MD



DD:  01/17/2018 23:20:51

DT:  01/17/2018 23:24:30

Job # [id number]

## 2018-01-18 NOTE — PN
ENDOCRINOLOGY FOLLOWUP NOTE



DATE:



LOCATION:  In room 572.



SUBJECTIVE:  This is a 47-year-old male with recent uncontrolled type 2

insulin-requiring diabetes presenting here with a left heel cellulitis with

underlying neuropathic ulceration and is now being followed closely for

metabolic management.  His glycemic levels are fluctuating, but improved

and the latest glucose levels have ranged from 107 to 131 and 151 mg/dL. 

His latest chemistry showed a BUN of 18, sodium of 136, potassium of 3.6,

chloride of 101, CO2 of 28, glucose of 136, and creatinine of 2.0.  So at

this time, we will continue the same basal and bolus insulin regimen to

allow for dose equilibration and keep him on the Levemir given as 36 units

subcu at bedtime daily as ordered.  We will continue the Humalog given as

14 units subcu t.i.d. before meals as given.  We will titrate incremental

as indicated to optimize metabolic control.  We will also continue the

low-dose IV hydration as ordered and obtain serial chemistries and

supplement accordingly as needed.  We will follow.





__________________________________________

Leti Vincent MD





DD:  01/18/2018 17:00:01

DT:  01/18/2018 17:02:42

Job # [id number]

## 2018-01-18 NOTE — PN
DATE:



SUBJECTIVE:  He is wanting to leave, but he cannot, he has a very bad left

foot heel ulcer and a left fifth toe gangrenous, doing an MRI of the foot

to make sure that it is not an osteo.  He is on aspirin, Bactroban cream,

Glucophage, potassium, Lasix, Levemir, Lipitor, Lopressor, Norvasc, Plavix,

Santyl, IV fluids and Zyvox IV.



PHYSICAL EXAMINATION:

VITAL SIGNS:  He has a 98.3 temperature, 80 pulse, 167/99 blood pressure,

20 respiratory rate, 96% O2 sat on room air.

HEENT:  Head is atraumatic, normocephalic.

HEART:  Regular rate.

LUNGS:  Decreased breath sounds bilaterally but clear.

ABDOMEN:  Morbidly obese, soft, nontender.

EXTREMITIES:  His left foot is bandaged.  I saw the podiatrist.  We

discussed the case.  He might need to have a circulatory procedure done

with Dr. Hoang Roman.



LABORATORY DATA:  He has 11.5 white count, 13.3 hemoglobin, 41.2

hematocrit, 294 platelets.  He has 136 sodium 3.6, BUN 18, creatinine is 2,

is little bit better than 2.2 the other day, 131 sugar.  Hemoglobin A1c is

15.7.  He still smokes, very noncompliant.  Total bili is 0.4, AST is 17,

ALT is 18, alk phos 81.



ASSESSMENT AND PLAN:  He has got multiple issues.  He has a very bad

circulation issues, might have to have a vessel open with Dr. Hoang Roman

hopefully tomorrow.  Continue with aggressive treatment and care and IV

antibiotics at moderate dose, and he is here for left heel ulcer, fifth toe

gangrene, acute kidney injury, coronary artery disease, diabetes,

hypertension, congestive heart failure, noncompliance.







__________________________________________

Jair Castañeda DO







DD:  01/18/2018 11:12:37

DT:  01/18/2018 11:16:04

Job # [id number]

## 2018-01-19 VITALS — OXYGEN SATURATION: 96 %

## 2018-01-19 LAB
ALBUMIN SERPL-MCNC: 3 [, G/DL] (ref 3–4.8)
ALBUMIN/GLOB SERPL: 0.8 [,] (ref 1.1–1.8)
ALT SERPL-CCNC: 17 [, U/L] (ref 7–56)
AST SERPL-CCNC: 22 [, U/L] (ref 17–59)
BUN SERPL-MCNC: 19 [, MG/DL] (ref 7–21)
CALCIUM SERPL-MCNC: 8.8 [, MG/DL] (ref 8.4–10.5)
ERYTHROCYTE [DISTWIDTH] IN BLOOD BY AUTOMATED COUNT: 14.8 [, %] (ref 11.5–14.5)
GFR NON-AFRICAN AMERICAN: 40 [,]
HGB BLD-MCNC: 13.4 [, G/DL] (ref 14–18)
MCH RBC QN AUTO: 25.1 [, PG] (ref 25–35)
MCHC RBC AUTO-ENTMCNC: 32.4 [, G/DL] (ref 31–37)
MCV RBC AUTO: 77.5 [, FL] (ref 80–105)
PLATELET # BLD: 299 [, 10^3/UL] (ref 120–450)
PMV BLD AUTO: 9.3 [, FL] (ref 7–11)
RBC # BLD AUTO: 5.34 [, 10^6/UL] (ref 3.5–6.1)
WBC # BLD AUTO: 11.5 [, 10^3/UL] (ref 4.5–11)

## 2018-01-19 PROCEDURE — 04CL3ZZ EXTIRPATION OF MATTER FROM LEFT FEMORAL ARTERY, PERCUTANEOUS APPROACH: ICD-10-PCS | Performed by: RADIOLOGY

## 2018-01-19 PROCEDURE — 047L3D1 DILATION OF LEFT FEMORAL ARTERY WITH INTRALUMINAL DEVICE, USING DRUG-COATED BALLOON, PERCUTANEOUS APPROACH: ICD-10-PCS | Performed by: RADIOLOGY

## 2018-01-19 RX ADMIN — INSULIN LISPRO SCH: 100 INJECTION, SOLUTION INTRAVENOUS; SUBCUTANEOUS at 23:09

## 2018-01-19 RX ADMIN — LINEZOLID SCH MLS/HR: 600 INJECTION, SOLUTION INTRAVENOUS at 10:02

## 2018-01-19 RX ADMIN — LINEZOLID SCH MLS/HR: 600 INJECTION, SOLUTION INTRAVENOUS at 22:54

## 2018-01-19 RX ADMIN — INSULIN LISPRO SCH: 100 INJECTION, SOLUTION INTRAVENOUS; SUBCUTANEOUS at 11:30

## 2018-01-19 RX ADMIN — INSULIN LISPRO SCH: 100 INJECTION, SOLUTION INTRAVENOUS; SUBCUTANEOUS at 08:06

## 2018-01-19 RX ADMIN — INSULIN DETEMIR SCH UNIT: 100 INJECTION, SOLUTION SUBCUTANEOUS at 22:55

## 2018-01-19 RX ADMIN — INSULIN LISPRO SCH UNITS: 100 INJECTION, SOLUTION INTRAVENOUS; SUBCUTANEOUS at 08:11

## 2018-01-19 RX ADMIN — INSULIN LISPRO SCH: 100 INJECTION, SOLUTION INTRAVENOUS; SUBCUTANEOUS at 18:40

## 2018-01-19 RX ADMIN — POTASSIUM CHLORIDE SCH MEQ: 20 TABLET, EXTENDED RELEASE ORAL at 09:59

## 2018-01-19 RX ADMIN — COLLAGENASE SANTYL SCH: 250 OINTMENT TOPICAL at 10:03

## 2018-01-19 RX ADMIN — INSULIN LISPRO SCH UNITS: 100 INJECTION, SOLUTION INTRAVENOUS; SUBCUTANEOUS at 11:53

## 2018-01-19 NOTE — PN
DATE:  01/19/2018

SUBJECTIVE:  I saw him sitting up in bed.  He is having a very bad left

heel ulcer, left fifth toe gangrenous toe.  He is having peripheral artery

disease.  He has MRSA in the wound, CHF, acute kidney injury, CAD,

hypertension, diabetes, cellulitis.  He has been seen by Infectious

Disease, Endocrinology, and Vascular.  He is supposed to go today with Dr. Hoang Roman for a vascular procedure to try and open up the vessels to the

left leg.  He is on IV antibiotics for a few more days as per Infectious

Disease.



PHYSICAL EXAMINATION:

VITAL SIGNS:  He has a 97.2 temp, 79 pulse, 140/89 blood pressure, 20

respiratory rate, and 96% O2 sat on room air.

HEENT:  Head is atraumatic and normocephalic.

HEART:  Regular rate.

LUNGS:  Decreased breath sounds bilaterally.

ABDOMEN:  Soft.  Morbidly obese.  Nontender.

EXTREMITIES:  Left foot is bandaged with very poor circulation.



MEDICATIONS:  He is on aspirin, Bactroban cream, Glucophage, insulin,

potassium, Lasix IV, Levemir, Lipitor, Lopressor, Norvasc, Plavix, Santyl,

IV fluid, and Zyvox IV.



LABORATORY DATA:  He has a 140 sodium, potassium 3.9, BUN is 90, and

creatinine 1.8.  GFR is 40, sugar is 140, calcium is 8.8, and total

bilirubin is 0.3.  AST is 22, ALT is 17, alkaline phosphatase 81, and total

protein 6.7.  His kidney function is starting to improve a little bit.  He

has a 11.5 white count, 13.4 hemoglobin, 41.4 hematocrit, with 299

platelets.



ASSESSMENT AND PLAN:  I have asked him numerous times in the past to please

watch a very low sugar diet and low cholesterol diet.  I also asked him to

please quit smoking and he is still a big time smoker even after cardiac

cath and stent placement.  He did have an MRI of the foot which showed no

osteomyelitis, which is a good thing.  Hopefully, they will do the

procedure today and get some circulation back into the foot.  Continue IV

antibiotics, checking his labs.  I discussed at length with him his

situation and how we are going to try to improve him.







__________________________________________

Jair Castañeda DO





DD:  01/19/2018 9:45:10

DT:  01/19/2018 13:42:09

[facility] # [id number]

## 2018-01-19 NOTE — CP.PCM.PN
Subjective





- Date & Time of Evaluation


Date of Evaluation: 01/19/18


Time of Evaluation: 12:45





- Subjective


Subjective: 





Comfortable, no fever, no pain in the left foot.





Objective





- Vital Signs/Intake and Output


Vital Signs (last 24 hours): 


 











Temp Pulse Resp BP Pulse Ox


 


 98.4 F   81   16   180/90 H  96 


 


 01/19/18 19:30  01/19/18 19:30  01/19/18 19:30  01/19/18 19:30  01/19/18 08:06








Intake and Output: 


 











 01/19/18 01/20/18





 18:59 06:59


 


Intake Total 1780 


 


Balance 1780 














- Medications


Medications: 


 Current Medications





Amlodipine Besylate (Norvasc)  2.5 mg PO DAILY [facility]


   Last Admin: 01/19/18 10:01 Dose:  2.5 mg


Aspirin (Aspirin Chewable)  81 mg PO DAILY [facility]


   Last Admin: 01/19/18 09:58 Dose:  81 mg


Atorvastatin Calcium (Lipitor)  40 mg PO DAILY [facility]


   Last Admin: 01/19/18 09:58 Dose:  40 mg


Clopidogrel Bisulfate (Plavix)  75 mg PO DAILY [facility]


   Last Admin: 01/19/18 09:58 Dose:  75 mg


Collagenase (Santyl)  0 gm TOP DAILY [facility]


   Last Admin: 01/19/18 10:03 Dose:  Not Given


Furosemide (Lasix)  40 mg IVP DAILY [facility]


   Last Admin: 01/19/18 10:01 Dose:  40 mg


Linezolid (Zyvox 600mg/300ml D5w)  600 mg in 300 mls @ 200 mls/hr IVPB Q12 [facility]


   PRN Reason: Protocol


   Stop: 01/26/18 13:05


   Last Admin: 01/19/18 10:02 Dose:  200 mls/hr


Sodium Chloride (Sodium Chloride 0.45%)  1,000 mls @ 80 mls/hr IV .[id number] [facility]


   Stop: 01/20/18 10:00


   Last Admin: 01/19/18 18:43 Dose:  Not Given


Insulin Detemir (Levemir)  36 unit SC HS [facility]


   Last Admin: 01/18/18 21:31 Dose:  36 unit


Insulin Human Lispro (Humalog Low)  0 units SC ACHS [facility]


   PRN Reason: Protocol


   Last Admin: 01/19/18 18:40 Dose:  Not Given


Insulin Human Lispro (Humalog)  14 units SC AC [facility]


   Last Admin: 01/19/18 18:40 Dose:  Not Given


Metformin HCl (Glucophage)  500 mg PO BID [facility]


   Last Admin: 01/19/18 18:40 Dose:  Not Given


Metoprolol Tartrate (Lopressor)  25 mg PO DAILY [facility]


   Last Admin: 01/19/18 10:01 Dose:  25 mg


Mupirocin (Bactroban Ointment)  0 gm TOP BID [facility]


   Last Admin: 01/19/18 18:39 Dose:  Not Given


Ondansetron HCl (Zofran Inj)  4 mg IVP ONCE [facility]


   PRN Reason: Nausea/Vomiting


   Stop: 01/19/18 23:59


Potassium Chloride (K-Dur 20 Meq Er Tab)  20 meq PO DAILY [facility]


   Last Admin: 01/19/18 09:59 Dose:  20 meq











- Labs


Labs: 


 





 01/19/18 07:20 





 01/19/18 07:20 











- Constitutional


Appears: Non-toxic





- Head Exam


Head Exam: NORMAL INSPECTION





- Respiratory Exam


Respiratory Exam: Decreased Breath Sounds





- Cardiovascular Exam


Cardiovascular Exam: +S1, +S2





- GI/Abdominal Exam


GI & Abdominal Exam: Soft.  absent: Tenderness





Assessment and Plan





- Assessment and Plan (Free Text)


Plan: 





Assessment


Left heel ulcer with skin and skin structure infection with MRSA with no 

evidence of osteomyelitis


morbid obesity with BMI 42


HTN


DM


CAD with chronic CHF


peripheral arterial disease








Plan


Continue Zyvox to complete 7 days


follow up plan for revascularization


will monitor clinically

## 2018-01-19 NOTE — PN
DATE:  01/18/2018



SUBJECTIVE:  The patient is seen earlier today.  No fevers.  No chills.



PHYSICAL EXAMINATION:

VITAL SIGNS:  Temperature is 98, blood pressure is 150/60, respiratory rate

is 16.

HEENT:  Unremarkable.

NECK:  Supple.

LUNGS:  Have decreased breath sounds.

HEART:  Normal S1 and S2.

ABDOMEN:  Soft.



LABORATORY DATA:  Reveals a white count of 11,500, hemoglobin 13. 

Chemistries reveal BUN of 18, creatinine of 2.  Microbiology revealed MRSA 

_____ abscess culture and foot culture.  Review of orders revealed the

patient is on linezolid.  The patient's MRI reveals no evidence of

osteomyelitis.



ASSESSMENT AND PLAN:  A 47-year-old male with morbid obesity, body mass

index of 42 with hypertension, diabetes, coronary artery disease,

peripheral arterial disease, congestive heart failure, myocardial

infarction, left heel ulcer with cellulitis with Methicillin-resistant

Staphylococcus aureus with a negative  _____, may be, he will use p.o.

Zyvox to complete seven days of antibiotics.





__________________________________________

Damon Henley MD

 



DD:  01/18/2018 22:09:01

DT:  01/18/2018 23:23:15

Job # [id number]

## 2018-01-19 NOTE — CP.PCM.PN
<DamionRachna - Last Filed: 01/19/18 15:04>





Subjective





- Date & Time of Evaluation


Date of Evaluation: 01/19/18


Time of Evaluation: 14:52





- Subjective


Subjective: 


48 y/o male seen at bedside for left heel ulceration. Denies having any pain in 

the left foot or noticing any drainage through his dressing. Pt says he has 

been using his reverse wedge shoe. He states he is willing to stay in house now 

as he is going to have stents put in both legs to help his blood flow. Denies F/

C/N/V/CP/SOB. Dressing remains clean dry and intact.











Objective





- Vital Signs/Intake and Output


Vital Signs (last 24 hours): 


 











Temp Pulse Resp BP Pulse Ox


 


 97.2 F L  85   20   133/69   96 


 


 01/19/18 08:06  01/19/18 10:01  01/19/18 08:06  01/19/18 10:01  01/19/18 08:06








Intake and Output: 


 











 01/19/18 01/19/18





 06:59 18:59


 


Intake Total 660 


 


Balance 660 














- Medications


Medications: 


 Current Medications





Amlodipine Besylate (Norvasc)  2.5 mg PO DAILY [facility]


   Last Admin: 01/19/18 10:01 Dose:  2.5 mg


Aspirin (Aspirin Chewable)  81 mg PO DAILY [facility]


   Last Admin: 01/19/18 09:58 Dose:  81 mg


Atorvastatin Calcium (Lipitor)  40 mg PO DAILY [facility]


   Last Admin: 01/19/18 09:58 Dose:  40 mg


Clopidogrel Bisulfate (Plavix)  75 mg PO DAILY [facility]


   Last Admin: 01/19/18 09:58 Dose:  75 mg


Collagenase (Santyl)  0 gm TOP DAILY [facility]


   Last Admin: 01/19/18 10:03 Dose:  Not Given


Furosemide (Lasix)  40 mg IVP DAILY [facility]


   Last Admin: 01/19/18 10:01 Dose:  40 mg


Linezolid (Zyvox 600mg/300ml D5w)  600 mg in 300 mls @ 200 mls/hr IVPB Q12 [facility]


   PRN Reason: Protocol


   Stop: 01/26/18 13:05


   Last Admin: 01/19/18 10:02 Dose:  200 mls/hr


Sodium Chloride (Sodium Chloride 0.45%)  1,000 mls @ 80 mls/hr IV .[id number] [facility]


   Stop: 01/20/18 10:00


   Last Admin: 01/18/18 23:00 Dose:  80 mls/hr


Insulin Detemir (Levemir)  36 unit SC HS [facility]


   Last Admin: 01/18/18 21:31 Dose:  36 unit


Insulin Human Lispro (Humalog Low)  0 units SC ACHS [facility]


   PRN Reason: Protocol


   Last Admin: 01/19/18 11:30 Dose:  Not Given


Insulin Human Lispro (Humalog)  14 units SC AC [facility]


   Last Admin: 01/19/18 11:53 Dose:  14 units


Metformin HCl (Glucophage)  500 mg PO BID [facility]


   Last Admin: 01/19/18 09:58 Dose:  500 mg


Metoprolol Tartrate (Lopressor)  25 mg PO DAILY [facility]


   Last Admin: 01/19/18 10:01 Dose:  25 mg


Mupirocin (Bactroban Ointment)  0 gm TOP BID [facility]


   Last Admin: 01/19/18 10:05 Dose:  1 applic


Potassium Chloride (K-Dur 20 Meq Er Tab)  20 meq PO DAILY [facility]


   Last Admin: 01/19/18 09:59 Dose:  20 meq











- Labs


Labs: 


 





 01/19/18 07:20 





 01/19/18 07:20 











- Constitutional


Appears: Well, Non-toxic, No Acute Distress





- Extremities Exam


Additional comments: 


Left lower extremity exam:


Vasc: DP/PT pulses faintly palpable 1/4. Temperature gradient is warm to cool. 

CFT < 4 sec to all digits. No pedal hair growth noted. Mild decrease in 

temperature noted to left 5th digit


Derm: Left heel full thickness ulceration measuring approx 2.9 cm x 1.6cm x 

0.3cm with mixed fibrous and necrotic wound bed. No active drainage, malodor, 

fluctuance, tunneling or undermining. Wound does not probe to bone. No olimpia 

wound erythema present. 5th digit exhibits dark red discoloration with blister 

noted to dorsum of MPJ, which remains intact.


Neuro: Protective sensation grossly diminished


Ortho: Wound to left heel non tender to palpation





- Neurological Exam


Neurological Exam: Alert, Awake, Oriented x3





- Psychiatric Exam


Psychiatric exam: Normal Affect, Normal Mood





Assessment and Plan





- Assessment and Plan (Free Text)


Assessment: 


48 y/o diabetic male with 1) left heel ulceration secondary to diabetes with 

peripheral vascular disease and 2) left fifth digit ischemic changes





Plan: 


Pt seen and evaluated at bedside


Discussed with attending Dr. Avalos


Labs and vitals reviewed- afebrile, WBC 11.5


L foot x-rays and MRI of LLE (-) for osteomyelitis


Wound cx (+) for MRSA


Arterial duplex studies reveal abnormal ABIs 


Dr. Roman to perform revascularization procedure with stent placement B/L


Wound cleansed with saline and dressed with Santyl and DSD


Continue Zyvox per ID


Continue to monitor left 5th toe - no need for dressing at this time


Podiatry will continue to follow while in house








<Taqueria Avalos - Last Filed: 01/19/18 18:03>





Objective





- Vital Signs/Intake and Output


Vital Signs (last 24 hours): 


 











Temp Pulse Resp BP Pulse Ox


 


 98.4 F   82   14   106/104 H  96 


 


 01/19/18 17:36  01/19/18 17:36  01/19/18 17:36  01/19/18 17:36  01/19/18 08:06








Intake and Output: 


 











 01/19/18 01/19/18





 06:59 18:59


 


Intake Total 660 1780


 


Balance 660 1780














- Medications


Medications: 


 Current Medications





Amlodipine Besylate (Norvasc)  2.5 mg PO DAILY [facility]


   Last Admin: 01/19/18 10:01 Dose:  2.5 mg


Aspirin (Aspirin Chewable)  81 mg PO DAILY [facility]


   Last Admin: 01/19/18 09:58 Dose:  81 mg


Atorvastatin Calcium (Lipitor)  40 mg PO DAILY [facility]


   Last Admin: 01/19/18 09:58 Dose:  40 mg


Clopidogrel Bisulfate (Plavix)  75 mg PO DAILY [facility]


   Last Admin: 01/19/18 09:58 Dose:  75 mg


Collagenase (Santyl)  0 gm TOP DAILY [facility]


   Last Admin: 01/19/18 10:03 Dose:  Not Given


Furosemide (Lasix)  40 mg IVP DAILY [facility]


   Last Admin: 01/19/18 10:01 Dose:  40 mg


Linezolid (Zyvox 600mg/300ml D5w)  600 mg in 300 mls @ 200 mls/hr IVPB Q12 [facility]


   PRN Reason: Protocol


   Stop: 01/26/18 13:05


   Last Admin: 01/19/18 10:02 Dose:  200 mls/hr


Sodium Chloride (Sodium Chloride 0.45%)  1,000 mls @ 80 mls/hr IV .[id number] [facility]


   Stop: 01/20/18 10:00


   Last Admin: 01/18/18 23:00 Dose:  80 mls/hr


Insulin Detemir (Levemir)  36 unit SC HS [facility]


   Last Admin: 01/18/18 21:31 Dose:  36 unit


Insulin Human Lispro (Humalog Low)  0 units SC ACHS [facility]


   PRN Reason: Protocol


   Last Admin: 01/19/18 11:30 Dose:  Not Given


Insulin Human Lispro (Humalog)  14 units SC AC [facility]


   Last Admin: 01/19/18 11:53 Dose:  14 units


Metformin HCl (Glucophage)  500 mg PO BID [facility]


   Last Admin: 01/19/18 09:58 Dose:  500 mg


Metoprolol Tartrate (Lopressor)  25 mg PO DAILY [facility]


   Last Admin: 01/19/18 10:01 Dose:  25 mg


Mupirocin (Bactroban Ointment)  0 gm TOP BID [facility]


   Last Admin: 01/19/18 10:05 Dose:  1 applic


Ondansetron HCl (Zofran Inj)  4 mg IVP ONCE [facility]


   PRN Reason: Nausea/Vomiting


   Stop: 01/19/18 23:59


Potassium Chloride (K-Dur 20 Meq Er Tab)  20 meq PO DAILY [facility]


   Last Admin: 01/19/18 09:59 Dose:  20 meq











- Labs


Labs: 


 





 01/19/18 07:20 





 01/19/18 07:20 











Attending/Attestation





- Attestation


I have personally seen and examined this patient.: Yes


I have fully participated in the care of the patient.: Yes


I have reviewed all pertinent clinical information, including history, physical 

exam and plan: Yes

## 2018-01-19 NOTE — VASCULAR
PROCEDURE:  1. Abdominal aortogram and bilateral lower extremity 

runoff with left selective views. 



2. Left SFA silver Hawk atherectomy, drug-eluting balloon angioplasty,

 and stent - graft placement 







HISTORY:

Diabetes.  Smoker. Ischemic yield ulcer and left lateral foot 

blister. Peripheral vascular disease. 



PHYSICIAN(S):  Hoang Roman M.D.







TECHNIQUE:

The relative risks and indications of the procedure were explained to 

the patient and consent obtained. The patient was hydrated prior to 

the procedure and the appropriate labs drawn. The patient was placed 

supine on the arteriogram table and the right groin prepped and 

draped in the usual sterile fashion. Conscious sedation and 

monitoring were provided throughout the procedure by a nurse. 



Via a right common femoral artery approach, a 5 Amharic sheath was 

placed in the right groin. Through the sheath and over a guidewire, a 

5 Amharic flush catheter was placed in the abdominal aorta at the 

level of the renal arteries and a PA DSA abdominal aortogram 

performed. The catheter was pulled down to the aortic bifurcation and 

an LPO DSA pelvic arteriogram performed. The catheter was advanced 

over the bifurcation placed in the left common femoral artery.  An 

overlapping DSA left lower extremity arteriogram was performed. 



A 0.035 angled Glidewire was advanced over the bifurcation and placed 

in the mid left SFA.. A 7 Amharic 45 cm destination sheath was placed 

in the left common femoral artery.. Heparin 5000 units IV and 

nitroglycerin in 250 mcg aliquots were given. The multilevel disease 

in the left SFA was crossed with a 0.035 glidewire and 5 Amharic 

catheter.. Exchange was made for a 0.014 support guidewire. 



Silver Hawk atherectomy of the proximal left SFA was performed with 

and LS catheter. Six passes were performed. Follow-up arteriography 

revealed a focal pseudoaneurysm extravasation from the atherectomy 

site. Quickly 6 mm x 10 cm Viabahn stent graft was deployed in the 

proximal left SFA. 



A 2nd focal stenosis in the distal left SFA was treated with the LS 

catheter.  Five passes were performed. A much improved but suboptimal 

result was obtained. The distal left SFA lesion was dilated with 7 mm 

x 6 cm drug-eluting balloon. Completion angiograms were obtained.  

The sheath was removed hemostasis obtained with a Perclose device. 

The patient tolerated the procedure well. A strong a palpable left 

dorsalis pedis pulses present. 



FINDINGS:

There are single renal arteries bilaterally which are widely patent 

and normal in appearance. The nephrograms are symmetric in 

appearance. The infrarenal abdominal aorta is tortuous with 

calcification.. The aortic bifurcation is widely patent. The common 

and external iliac arteries are normal in appearance without a 

significant stenosis. The internal iliac arteries are patent 

bilaterally.



Right lower extremity: The right common femoral artery is patent. The 

right profunda femoral artery is hypertrophied. The right SFA 

occludes its origin



Left lower extremity: Left common femoral artery is patent. The left 

profunda femoral artery is hypertrophied. The proximal left SFA is 

atretic with a critical stenosis and small amount of thrombus seen 

centimeters from the origin. A 2nd severe focal stenosis is seen in 

the distal left SFA. The left popliteal artery is patent and 

continuous.  Left trifurcation is intact.  There is normal 3 vessel 

tibial runoff on the left. 



IMPRESSION:

1.Successful silver Hawk atherectomy, drug-eluting balloon 

angioplasty, and stent-graft placement in the left SFA.



2. Normal 3 vessel tibial runoff on the left.

## 2018-01-20 VITALS — TEMPERATURE: 97.9 F | HEART RATE: 88 BPM

## 2018-01-20 VITALS — RESPIRATION RATE: 20 BRPM

## 2018-01-20 LAB
ALBUMIN SERPL-MCNC: 2.9 [, G/DL] (ref 3–4.8)
ALBUMIN/GLOB SERPL: 0.8 [,] (ref 1.1–1.8)
ALT SERPL-CCNC: 22 [, U/L] (ref 7–56)
AST SERPL-CCNC: 18 [, U/L] (ref 17–59)
BUN SERPL-MCNC: 18 [, MG/DL] (ref 7–21)
CALCIUM SERPL-MCNC: 8.4 [, MG/DL] (ref 8.4–10.5)
ERYTHROCYTE [DISTWIDTH] IN BLOOD BY AUTOMATED COUNT: 14.6 [, %] (ref 11.5–14.5)
ERYTHROCYTE [DISTWIDTH] IN BLOOD BY AUTOMATED COUNT: 14.7 [, %] (ref 11.5–14.5)
GFR NON-AFRICAN AMERICAN: 46 [,]
HGB BLD-MCNC: 13.2 [, G/DL] (ref 14–18)
HGB BLD-MCNC: 13.8 [, G/DL] (ref 14–18)
MCH RBC QN AUTO: 25 [, PG] (ref 25–35)
MCH RBC QN AUTO: 25.3 [, PG] (ref 25–35)
MCHC RBC AUTO-ENTMCNC: 32.4 [, G/DL] (ref 31–37)
MCHC RBC AUTO-ENTMCNC: 33.1 [, G/DL] (ref 31–37)
MCV RBC AUTO: 76.5 [, FL] (ref 80–105)
MCV RBC AUTO: 77.2 [, FL] (ref 80–105)
PLATELET # BLD: 295 [, 10^3/UL] (ref 120–450)
PLATELET # BLD: 297 [, 10^3/UL] (ref 120–450)
PMV BLD AUTO: 9.2 [, FL] (ref 7–11)
PMV BLD AUTO: 9.2 [, FL] (ref 7–11)
RBC # BLD AUTO: 5.27 [, 10^6/UL] (ref 3.5–6.1)
RBC # BLD AUTO: 5.45 [, 10^6/UL] (ref 3.5–6.1)
WBC # BLD AUTO: 14.5 [, 10^3/UL] (ref 4.5–11)
WBC # BLD AUTO: 16 [, 10^3/UL] (ref 4.5–11)

## 2018-01-20 PROCEDURE — 0T9B70Z DRAINAGE OF BLADDER WITH DRAINAGE DEVICE, VIA NATURAL OR ARTIFICIAL OPENING: ICD-10-PCS | Performed by: FAMILY MEDICINE

## 2018-01-20 RX ADMIN — INSULIN LISPRO SCH: 100 INJECTION, SOLUTION INTRAVENOUS; SUBCUTANEOUS at 12:00

## 2018-01-20 RX ADMIN — LINEZOLID SCH MLS/HR: 600 INJECTION, SOLUTION INTRAVENOUS at 21:39

## 2018-01-20 RX ADMIN — INSULIN LISPRO SCH: 100 INJECTION, SOLUTION INTRAVENOUS; SUBCUTANEOUS at 21:25

## 2018-01-20 RX ADMIN — COLLAGENASE SANTYL SCH APPLIC: 250 OINTMENT TOPICAL at 10:25

## 2018-01-20 RX ADMIN — INSULIN LISPRO SCH: 100 INJECTION, SOLUTION INTRAVENOUS; SUBCUTANEOUS at 14:11

## 2018-01-20 RX ADMIN — POTASSIUM CHLORIDE SCH MEQ: 20 TABLET, EXTENDED RELEASE ORAL at 10:24

## 2018-01-20 RX ADMIN — INSULIN DETEMIR SCH: 100 INJECTION, SOLUTION SUBCUTANEOUS at 21:25

## 2018-01-20 RX ADMIN — LINEZOLID SCH MLS/HR: 600 INJECTION, SOLUTION INTRAVENOUS at 10:23

## 2018-01-20 RX ADMIN — INSULIN LISPRO SCH UNITS: 100 INJECTION, SOLUTION INTRAVENOUS; SUBCUTANEOUS at 17:10

## 2018-01-20 RX ADMIN — INSULIN LISPRO SCH: 100 INJECTION, SOLUTION INTRAVENOUS; SUBCUTANEOUS at 17:11

## 2018-01-20 RX ADMIN — INSULIN LISPRO SCH: 100 INJECTION, SOLUTION INTRAVENOUS; SUBCUTANEOUS at 08:00

## 2018-01-20 NOTE — PN
DATE:



ENDOCRINOLOGY FOLLOWUP NOTE



LOCATION:  Room 274.



SUBJECTIVE:  This is a 47-year-old male with recent uncontrolled type 2

insulin-requiring diabetes presenting here with a left heel cellulitis with

underlying neuropathic ulceration and is now being followed closely for

metabolic management.  He also has a left toe gangrenous changes as noted

thereof.  He has ongoing IV antibiotics with local debridement procedures

as given.



LABORATORY DATA:  His glycemic levels have improved and the latest glucose

values have ranged from 137 to 155 and 179 mg/dL.  His latest chemistry

showed a BUN of 19, sodium 140, potassium 3.9, chloride 103, CO2 31,

glucose 140 and creatinine 1.8.



PLAN:  So at this time, we will continue the same basal and bolus insulin

regimen as given with Humalog given as 14 units subcu t.i.d. before meals

as ordered.  We will continue the low-dose correction scale using Humalog

insulin as given.  We will also continue the basal insulin given as Levemir

at 36 units subcu at bedtime daily as ordered.  We will obtain serial

chemistries and supplement accordingly needed.  We will also continue the

IV hydration as given.  We will follow.







__________________________________________

Leti Vincent MD





DD:  01/19/2018 22:33:40

DT:  01/19/2018 22:35:52

Job # [id number]

## 2018-01-20 NOTE — PN
DATE:



ENDOCRINOLOGY FOLLOWUP NOTE



LOCATION:  In room 572.



SUBJECTIVE:  This is a 47-year-old male with recent uncontrolled type 2

insulin-requiring diabetes, now being followed closely for metabolic

management.  He is undergoing IV antibiotic management and debridement for

left heel cellulitis and is also being followed closely for metabolic

management.



LABORATORY DATA: His glycemic levels are fluctuating, but much improved at

this time and the glucose levels are ranged from 141 to 218 mg/dL.  The

latest chemistries showed a BUN of 18, sodium 140, potassium 4.0, chloride

103, CO2 of 29, glucose 142, and creatinine 1.6.



PLAN:  So at this time, we will continue the same basal and bolus insulin

regimen to allow for dose equilibration and keep him on the Levemir given

as 36 units subcu at bedtime daily as given.  We will continue the Humalog

given as 14 units subcu t.i.d. before meals as ordered.  We will also

continue the metformin given as 500 mg b.i.d. after meals as given.  We

will titrate incrementally as indicated to optimize metabolic control.  We

will follow and advise accordingly.





__________________________________________

Leti Vincent MD



DD:  01/20/2018 18:59:14

DT:  01/20/2018 22:20:13

Job # [id number]

## 2018-01-20 NOTE — PN
DATE:



SUBJECTIVE:  A 47-year-old diabetic male seen at bedside for continued

evaluation and management with slowly resolving left heel ulceration.  The

patient is status post left SFA SilverHawk atherectomy with stent graft

placement x1 day.  He is reporting no pain in his lower leg or heel.



PHYSICAL EXAMINATION:

VITAL SIGNS:  Revealed temperature of 98, blood pressure of 163/81, pulse

rate of 91, respiratory rate of 20.

EXTREMITIES:  Palpable pedal pulses noted bilaterally.  Left foot presents

warm to the touch.  Capillary filling time has increased.  The patient is

unable to detect 5.07 g monofilament wire testing bilaterally.  The left

heel presents with a full-thickness ulceration that measures approximately

3 cm x 1.5 cm x 0.3 cm.  The base of the ulceration is a mixed, fibrous,

necrotic, slightly gangrenous tissue.  There is noted to be no malodor. 

The wound does not probe to tendon or bone.  There is no purulence to

suggest underlying abscess formation.  There is no flocculence to indicate

the presence of an abscess.  There is noted to be a dusky discoloration to

the left fifth digit with a blister formation.



LABORATORY FINDINGS:  Reveal white count of 14.5 down from 16 yesterday. 

Hemoglobin of 13.2, hematocrit of 40.7, platelet count of 297. 

Microbiology report reveals MRSA growth in his left heel ulcer.  There is

no radiographic evidence of osteomyelitis via x-rays and MRI testing.



ASSESSMENT:  A 47-year-old uncontrolled diabetic with a full-thickness left

heel ulceration as well as some ischemic changes to his left fifth digit.



PLAN:  The patient was seen and evaluated at bedside.  The patient's white

count remains high at 14.5.  However, his left heel wound does not present

with underlying abscess formation.  I do not believe the etiology of his

white count is from his heel.  All testing reveals negative osteomyelitis. 

The patient now has palpable pedal pulses to the left foot secondary to

revascularization done yesterday.  The patient will continue with

antibiotics as per Infectious Disease.  We will cleanse the wound with

normal sterile saline and apply Santyl and a dry sterile dressing to the

left heel.  We will continue to monitor the left fifth digit for ischemic

changes.  The patient had 2 Coca-Alcon and ate takeout of rice at his

bedside, take out of Chinese food with rice at his bedside.  We spoke with

Irvin at length on the need to avoid sugary sodas as well as starchy

foods such as rice, potatoes, pasta and bread specifically in an effort to

control his blood sugars which in turn will aid in wound healing.  The

patient will be seen and followed daily.





__________________________________________

Taqueria Avalos DPM





DD:  01/20/2018 8:47:23

DT:  01/20/2018 8:54:20

Job # [id number]

## 2018-01-20 NOTE — PN
DATE:



I saw him sitting out of bed to chair.  He had a procedure done yesterday

by Dr. Hoang Roman and he did very well and he is going to do better with

his heel ulcer and his fifth toe, that is kind of all excoriated, so that

is going to be   He is hungry.  He got Albert catheter, did have some

hematuria but it is now cleared so we are going to discontinue the Albert

catheter.  He has a left heel ulcer, cellulitis, diabetes, hypertension,

CAD, PVD, MRSA, CHF, acute kidney injury and he is actually improving.



PHYSICAL EXAMINATION:

VITAL SIGNS:  97.9 temperature, 80 pulse, 148/70 blood pressure, 20

respiratory rate, 96% O2 saturation on room air.

HEENT:  Head is atraumatic, normocephalic.

HEART:  Regular rate.

LUNGS:  Clear to auscultation.

ABDOMEN:  Soft, morbidly obese, nontender.  Positive bowel sounds.

EXTREMITIES:  His left foot is bandaged.



MEDICATIONS:  He is on aspirin, Bactroban, Glucophage, insulin, potassium,

Lasix, Lipitor, Lopressor, Norvasc, Plavix, Santyl, and Zyvox.



DATA:  He has a 14.5 white count, a little high of 13.3 hemoglobin, 40.7

hematocrit with 297,000 platelets.  Sodium is 147, potassium is 4, BUN is

80, creatinine 1.6.  He is getting better.  Last blood sugar was 141. 

Hemoglobin A1c was 15.7.  Total bilirubin is 0.4, AST is 18, ALT is 22,

alkaline phosphatase 89.



Very noncompliant patient, still smokes, does not follow his diet, has got

severe coronary artery disease and diabetes, is now having peripheral

vascular disease and a heel ulcer on the left foot and a bad fifth toe on

the left foot.  Hopefully, if the procedure were vascular, he will improve.

Continue IV antibiotics.  Check his labs tomorrow. 







__________________________________________

Jair Castañeda DO



DD:  01/20/2018 11:48:18

DT:  01/20/2018 11:50:40

Job # [id number]





[facility]

## 2018-01-20 NOTE — CP.PCM.PN
Subjective





- Date & Time of Evaluation


Date of Evaluation: 01/20/18


Time of Evaluation: 00:35





- Subjective


Subjective: 





called by nurse pt is c/o left buttock pain  and has hematuria,pt is s/p left  

SFA angioplasty and stent placement has to place lower extremity flat . pt also 

has foly,s catheter.and urine is pink color.





Objective





- Vital Signs/Intake and Output


Vital Signs (last 24 hours): 


 











Temp Pulse Resp BP Pulse Ox


 


 98.0 F   91 H  20   163/81 H  96 


 


 01/19/18 20:00  01/19/18 23:32  01/19/18 23:32  01/19/18 23:32  01/19/18 08:06








Intake and Output: 


 











 01/19/18 01/20/18





 18:59 06:59


 


Intake Total 1780 


 


Balance 1780 














- Medications


Medications: 


 Current Medications





Amlodipine Besylate (Norvasc)  2.5 mg PO DAILY [facility]


   Last Admin: 01/19/18 10:01 Dose:  2.5 mg


Aspirin (Aspirin Chewable)  81 mg PO DAILY [facility]


   Last Admin: 01/19/18 09:58 Dose:  81 mg


Atorvastatin Calcium (Lipitor)  40 mg PO DAILY [facility]


   Last Admin: 01/19/18 09:58 Dose:  40 mg


Clopidogrel Bisulfate (Plavix)  75 mg PO DAILY [facility]


   Last Admin: 01/19/18 09:58 Dose:  75 mg


Collagenase (Santyl)  0 gm TOP DAILY [facility]


   Last Admin: 01/19/18 10:03 Dose:  Not Given


Furosemide (Lasix)  40 mg IVP DAILY [facility]


   Last Admin: 01/19/18 10:01 Dose:  40 mg


Linezolid (Zyvox 600mg/300ml D5w)  600 mg in 300 mls @ 200 mls/hr IVPB Q12 [facility]


   PRN Reason: Protocol


   Stop: 01/26/18 13:05


   Last Admin: 01/19/18 22:54 Dose:  200 mls/hr


Sodium Chloride (Sodium Chloride 0.45%)  1,000 mls @ 80 mls/hr IV .[id number] [facility]


   Stop: 01/20/18 10:00


   Last Admin: 01/19/18 18:43 Dose:  Not Given


Insulin Detemir (Levemir)  36 unit SC HS [facility]


   Last Admin: 01/19/18 22:55 Dose:  36 unit


Insulin Human Lispro (Humalog Low)  0 units SC ACHS [facility]


   PRN Reason: Protocol


   Last Admin: 01/19/18 23:09 Dose:  Not Given


Insulin Human Lispro (Humalog)  14 units SC AC [facility]


   Last Admin: 01/19/18 18:40 Dose:  Not Given


Metformin HCl (Glucophage)  500 mg PO BID [facility]


   Last Admin: 01/19/18 18:40 Dose:  Not Given


Metoprolol Tartrate (Lopressor)  25 mg PO DAILY [facility]


   Last Admin: 01/19/18 10:01 Dose:  25 mg


Mupirocin (Bactroban Ointment)  0 gm TOP BID [facility]


   Last Admin: 01/19/18 18:39 Dose:  Not Given


Potassium Chloride (K-Dur 20 Meq Er Tab)  20 meq PO DAILY [facility]


   Last Admin: 01/19/18 09:59 Dose:  20 meq











- Labs


Labs: 


 





 01/20/18 00:50 





 01/19/18 07:20 





 











APTT  33.8 Seconds (25.1-36.5)   01/20/18  00:50    














- Constitutional


Appears: No Acute Distress





- Head Exam


Head Exam: NORMOCEPHALIC





- Eye Exam


Eye Exam: Normal appearance


Pupil Exam: PERRL





- ENT Exam


ENT Exam: Mucous Membranes Moist





- Neck Exam


Neck Exam: Full ROM





- Respiratory Exam


Respiratory Exam: Clear to Ausculation Bilateral





- Cardiovascular Exam


Cardiovascular Exam: RRR, +S1, +S2





- GI/Abdominal Exam


GI & Abdominal Exam: Soft





- Rectal Exam


Rectal Exam: Deferred





- Extremities Exam


Extremities Exam: Full ROM


Additional comments: 





pt has normal femoral and pedal pulses.





- Neurological Exam


Neurological Exam: Alert, Awake





- Psychiatric Exam


Psychiatric exam: Anxious





- Skin


Skin Exam: Dry, Warm





Assessment and Plan





- Assessment and Plan (Free Text)


Assessment: 





hematuria/s/p lef sfa angioplasty and stent.


left buttock pain.


Plan: 





irrigated foly,s catheter.with retrieval of clots. 


cbc stat/ type and cross 1 unit of prbc.


pt ptt.stat.


percocet 1tab x1.


pt improved after starting moving lower extremity .


urine color started improving .

## 2018-01-21 VITALS — SYSTOLIC BLOOD PRESSURE: 136 MMHG | DIASTOLIC BLOOD PRESSURE: 65 MMHG

## 2018-01-21 LAB
ALBUMIN SERPL-MCNC: 3.1 [, G/DL] (ref 3–4.8)
ALBUMIN/GLOB SERPL: 0.8 [,] (ref 1.1–1.8)
ALT SERPL-CCNC: 19 [, U/L] (ref 7–56)
AST SERPL-CCNC: 23 [, U/L] (ref 17–59)
BUN SERPL-MCNC: 16 [, MG/DL] (ref 7–21)
CALCIUM SERPL-MCNC: 8.6 [, MG/DL] (ref 8.4–10.5)
ERYTHROCYTE [DISTWIDTH] IN BLOOD BY AUTOMATED COUNT: 14.9 [, %] (ref 11.5–14.5)
GFR NON-AFRICAN AMERICAN: 43 [,]
HGB BLD-MCNC: 13.4 [, G/DL] (ref 14–18)
MCH RBC QN AUTO: 24.7 [, PG] (ref 25–35)
MCHC RBC AUTO-ENTMCNC: 31.9 [, G/DL] (ref 31–37)
MCV RBC AUTO: 77.5 [, FL] (ref 80–105)
PLATELET # BLD: 317 [, 10^3/UL] (ref 120–450)
PMV BLD AUTO: 9.4 [, FL] (ref 7–11)
RBC # BLD AUTO: 5.42 [, 10^6/UL] (ref 3.5–6.1)
WBC # BLD AUTO: 12.4 [, 10^3/UL] (ref 4.5–11)

## 2018-01-21 RX ADMIN — POTASSIUM CHLORIDE SCH MEQ: 20 TABLET, EXTENDED RELEASE ORAL at 09:56

## 2018-01-21 NOTE — CP.PCM.PCO
Physician Communication Note





- Physician Communication Note


Physician Communication Note: Please see attached section for AMA summary





Summary





- Summary of Event


Summary of Event: 





House Physician Resident


Frederic Dueñas, PGY-2 IM





Called by nursing due to patient wishing to leave against medical advice (AMA).

  Reviewed patient's chart, 48 yo M with hx including DM, HTN, noted to be 

frequently non-compliant with medications, s/p stenting procedure to lower 

extremity by IR, currently being treated for suspected infected LE wound.  

Patient seen at bedside and insisting that he wants to leave, feels like he has 

been here too long and doesn't feel like staff is communicating well with him, 

although he then admits that he has been seen and updated by PMD (Dr. Castañeda) 

daily.  Risks of leaving against medical advice, including worsening of LE 

wound infection, possible progression to osteomyelitis, sepsis, septic shock, 

and death were discussed with patient, who expressed understanding, but still 

elected to leave AMA.  AMA paperwork filled out, and patient then left against 

medical advice.  PMD notified over the phone.

## 2018-01-21 NOTE — CP.PCM.PN
Subjective





- Date & Time of Evaluation


Date of Evaluation: 01/21/18


Time of Evaluation: 11:05





- Subjective


Subjective: 


Podiatry Progress note for Dr. Pineda





47 year old male seen at bedside for left heel ulceration. He currently denies 

any pain to his left foot. He is AAOx3 and is seen sitting in a chair at the 

time of visit. He denies any n/v/f/c/sob/cp.





Objective





- Vital Signs/Intake and Output


Vital Signs (last 24 hours): 


 











Temp Pulse Resp BP Pulse Ox


 


 97.9 F   88   20   136/65   96 


 


 01/20/18 08:00  01/20/18 08:00  01/20/18 08:00  01/21/18 09:56  01/20/18 08:00








Intake and Output: 


 











 01/21/18 01/21/18





 06:59 18:59


 


Intake Total 540 120


 


Output Total 400 


 


Balance 140 120














- Medications


Medications: 


 Current Medications





Amlodipine Besylate (Norvasc)  2.5 mg PO DAILY [facility]


   Last Admin: 01/21/18 09:56 Dose:  2.5 mg


Aspirin (Aspirin Chewable)  81 mg PO DAILY [facility]


   Last Admin: 01/21/18 09:56 Dose:  81 mg


Atorvastatin Calcium (Lipitor)  40 mg PO DAILY [facility]


   Last Admin: 01/21/18 09:56 Dose:  40 mg


Clopidogrel Bisulfate (Plavix)  75 mg PO DAILY [facility]


   Last Admin: 01/21/18 09:55 Dose:  75 mg


Collagenase (Santyl)  0 gm TOP DAILY [facility]


   Last Admin: 01/20/18 10:25 Dose:  1 applic


Collagenase (Santyl)  3 gm TOP DAILY [facility]


   Stop: 01/26/18 23:00


Furosemide (Lasix)  40 mg IVP DAILY [facility]


   Last Admin: 01/21/18 09:55 Dose:  40 mg


Linezolid (Zyvox 600mg/300ml D5w)  600 mg in 300 mls @ 200 mls/hr IVPB Q12 [facility]


   PRN Reason: Protocol


   Stop: 01/26/18 13:05


   Last Admin: 01/20/18 21:39 Dose:  200 mls/hr


Insulin Detemir (Levemir)  36 unit SC HS [facility]


   Last Admin: 01/20/18 21:25 Dose:  Not Given


Insulin Human Lispro (Humalog Low)  0 units SC ACHS [facility]


   PRN Reason: Protocol


   Last Admin: 01/20/18 21:25 Dose:  Not Given


Insulin Human Lispro (Humalog)  14 units SC AC [facility]


   Last Admin: 01/20/18 17:10 Dose:  14 units


Metformin HCl (Glucophage)  500 mg PO BID [facility]


   Last Admin: 01/21/18 09:56 Dose:  500 mg


Metoprolol Tartrate (Lopressor)  25 mg PO DAILY [facility]


   Last Admin: 01/21/18 09:56 Dose:  25 mg


Mupirocin (Bactroban Ointment)  0 gm TOP BID [facility]


   Last Admin: 01/20/18 10:25 Dose:  1 applic


Potassium Chloride (K-Dur 20 Meq Er Tab)  20 meq PO DAILY [facility]


   Last Admin: 01/21/18 09:56 Dose:  20 meq











- Labs


Labs: 


 





 01/21/18 06:15 





 01/21/18 06:15 





 











APTT  33.8 Seconds (25.1-36.5)   01/20/18  00:50    














- Constitutional


Appears: Well, Non-toxic, No Acute Distress





- Extremities Exam


Additional comments: 


Left lower extremity focused exam:





Vasc: DP and PT pulses faintly palpable 1/4. Temperature gradient is warm to 

cool. CFT < 4 sec to all digits. No pedal hair growth noted. Mild decrease in 

temperature noted to left 5th digit





Derm: Left heel full thickness ulceration measuring approximately 2.5 cm x 1.5 

cm x 0.3 cm with mixed fibrous and necrotic wound bed. No active drainage, 

malodor, fluctuance, tunneling or undermining. Wound does not probe to bone. No 

olimpia wound erythema present. 5th digit exhibits dark red discoloration with 

blister noted to dorsum of MPJ, which remains intact.





Neuro: Protective sensation grossly diminished





Ortho: Wound to left heel non-tender to palpation





- Neurological Exam


Neurological Exam: Alert, Awake, Oriented x3





- Psychiatric Exam


Psychiatric exam: Normal Affect, Normal Mood





Assessment and Plan





- Assessment and Plan (Free Text)


Assessment: 


47 year old male with left heel ulceration secondary to diabetes with 

peripheral vascular disease and left fifth digit ischemic changes


Plan: 


Pt seen and evaluated at bedside


Discussed with attending Dr. Pineda


Labs and vitals reviewed- afebrile, WBC 12.4


L foot x-rays and MRI of LLE (-) for osteomyelitis


Wound cx (+) for MRSA


Wound cleansed with saline and dressed with Santyl and DSD


Continue Zyvox per ID


Continue to monitor left 5th toe - no need for dressing at this time


Podiatry will continue to follow while in house

## 2018-01-21 NOTE — PN
DATE:



SUBJECTIVE:  I saw him sitting up in bed.  His foot is bandaged.  He has a

left heel ulcer and left fifth toe gangrenous.  He just had a procedure

with the stent placed in circulation of his left leg, so it would heal his

peripheral artery disease.  He is telling me he wants to leave today.  He

almost signed out already, but he cannot leave, as far as I am concerned. 

He has got a 12,000 white count MRSA in the leg, not really doing well yet,

may be tomorrow, if the numbers are below 10.



PHYSICAL EXAMINATION:

GENERAL:  He is alert.  He is smiling.  He is comfortable.

VITAL SIGNS:  Temperature 97.9, 88 pulse, 148/78 blood pressure, 20

respiratory rate, and 96% O2 saturation on 2 L nasal cannula.

HEENT:  His head is atraumatic and noncontributory.

HEART:  Regular rate.

LUNGS:  Decreased breath sounds, but clear.

ABDOMEN:  Soft.  Morbidly obese and nontender.

EXTREMITIES:  Left foot bandaged by Podiatry.



LABORATORY DATA:  He has a 141 sodium, potassium 3.6, BUN 16, creatinine

1.7 little bit high, GFR is 43, sugars 145, calcium is 8.6, total bilirubin

0.5, AST 23, ALT 19, alkaline phosphatase 91, and total protein 6.8.  His

white count was as high as 16, it is down to 12.4, it has got to be below

10, his hemoglobin 13.4, hematocrit 42, and platelets of 317.  He has got

MRSA growing out.



MEDICATIONS:  He is on aspirin, Bactroban, Glucophage, insulin, potassium,

Lasix, Levemir, Lipitor, Lopressor, Norvasc, Plavix, Santyl, and Zyvox.



ASSESSMENT AND PLAN:  He is being seen by Podiatry, Infectious Disease,

Endocrinology.  I am quite concerned that he wants to leave before he

finish treatment.  I would not discharge him, he needs to finish the

treatment, hopefully he will.  He has got to quit smoking.  He has got to

eat healthy.  So far he has not prove that he could follow a correct diet

or quit smoking.  He is very noncompliant with his medications.  We

discussed loosing his legs because of his noncompliance with basic

treatment, hopefully he will do well.  He has got numerous issues; left

heel ulcer, which methicillin-resistant Staphylococcus aureus; left fifth

toe, which is gangrene; peripheral vascular disease, status post stent; he

is a smoker.  He does not follow directions with his diet.  He has

cellulitis, diabetes, hypertension, coronary artery disease, and acute

kidney injury.



__________________________________________

Jair Castañeda DO



DD:  01/21/2018 13:10:03

DT:  01/21/2018 14:04:52

Job # [id number]

## 2018-01-21 NOTE — PN
DATE:  01/21/2018



SUBJECTIVE:  The patient is seen early this morning in room 572, bed 2.  No

fevers and no chills.  Tolerating the antibiotics well.



PHYSICAL EXAMINATION

VITAL SIGNS:  Temperature is 98, blood pressure is 112/70, and respiratory

rate is 16.

HEENT:  Examination of HEENT is unremarkable.

NECK:  Supple.

LUNGS:  Have decreased breath sounds.

HEART:  Normal S1 and S2.

ABDOMEN:  Soft and nontender.



LABORATORY DATA:  Reveals white count of 4400 and hemoglobin of 13.  Blood

gasses are noted.  Chemistries revealed BUN of 16 and creatinine of 1.7. 

Microbiology reveals the MRSA noted.



ASSESSMENT AND PLAN:  This is a 47-year-old male, seen earlier this morning

and tolerating the antibiotics well with left heel ulcer and skin structure

infection with methicillin-resistant Staphylococcus aureus.  No evidence of

osteomyelitis in a patient with morbid obesity with body mass index of 42,

hypertension, and diabetes, to complete Zyvox therapy.  The patient's

questions were answered.  He seems it has been dissatisfied _____

hospitalization, I explained to him the antibiotics and the fact that his

imaging showed no osteomyelitis, and the culture results and the need for

short course of antibiotics.







__________________________________________

Damon Henley MD





DD:  01/21/2018 17:46:49

DT:  01/21/2018 22:27:16

Job # [id number]

## 2018-01-21 NOTE — PN
DATE:  01/20/2018



SUBJECTIVE:  The patient is seen early this morning in room 572, bed 2.



PHYSICAL EXAMINATION

VITAL SIGNS:  The patient's temperature is 97, blood pressure is 150/60,

and respiratory rate is 16.

HEENT:  Unremarkable.

NECK:  Supple.



LABORATORY DATA:  Reveals the patient's white count is down to 14,500. 

Coagulation is noted.  Chemistries revealed BUN of 18, creatinine of 1.6. 

Microbiology reveals the left foot with MRSA and abscesses.  Since MRSA

_____  on vancomycin.



MEDICATIONS:  Review of medications had revealed the patient to be on

Zyvox.



ASSESSMENT AND PLAN:  This is a 47-year-old male, seen earlier this morning

in 572, bed 2.  Tolerating the antibiotics well and with left heel ulcer

and skin structure infection with methicillin-resistant Staphylococcus

aureus.  No evidence of osteomyelitis, but morbid obesity with body mass

index of 42, hypertension, diabetes; on Zyvox to complete seven days, may

switch to p.o. Zyvox upon complete therapy.





__________________________________________

Damon Henley MD

 



DD:  01/20/2018 23:13:27

DT:  01/21/2018 0:42:26

Job # [id number]

## 2018-02-19 ENCOUNTER — HOSPITAL ENCOUNTER (OUTPATIENT)
Dept: HOSPITAL 42 - SDS | Age: 48
Discharge: HOME | End: 2018-02-19
Attending: PODIATRIST
Payer: COMMERCIAL

## 2018-02-19 VITALS — SYSTOLIC BLOOD PRESSURE: 165 MMHG | DIASTOLIC BLOOD PRESSURE: 67 MMHG

## 2018-02-19 VITALS — HEART RATE: 80 BPM

## 2018-02-19 VITALS — BODY MASS INDEX: 39.9 KG/M2

## 2018-02-19 VITALS — OXYGEN SATURATION: 97 %

## 2018-02-19 VITALS — RESPIRATION RATE: 14 BRPM | TEMPERATURE: 98.4 F

## 2018-02-19 DIAGNOSIS — I11.0: ICD-10-CM

## 2018-02-19 DIAGNOSIS — M86.172: ICD-10-CM

## 2018-02-19 DIAGNOSIS — Z80.3: ICD-10-CM

## 2018-02-19 DIAGNOSIS — E11.621: ICD-10-CM

## 2018-02-19 DIAGNOSIS — E11.52: ICD-10-CM

## 2018-02-19 DIAGNOSIS — I96: ICD-10-CM

## 2018-02-19 DIAGNOSIS — E11.69: Primary | ICD-10-CM

## 2018-02-19 DIAGNOSIS — I25.2: ICD-10-CM

## 2018-02-19 DIAGNOSIS — E66.01: ICD-10-CM

## 2018-02-19 DIAGNOSIS — Z86.73: ICD-10-CM

## 2018-02-19 DIAGNOSIS — Z91.19: ICD-10-CM

## 2018-02-19 DIAGNOSIS — E78.5: ICD-10-CM

## 2018-02-19 DIAGNOSIS — Z95.5: ICD-10-CM

## 2018-02-19 DIAGNOSIS — L97.422: ICD-10-CM

## 2018-02-19 DIAGNOSIS — I50.22: ICD-10-CM

## 2018-02-19 PROCEDURE — 73630 X-RAY EXAM OF FOOT: CPT

## 2018-02-19 PROCEDURE — 88311 DECALCIFY TISSUE: CPT

## 2018-02-19 PROCEDURE — 88305 TISSUE EXAM BY PATHOLOGIST: CPT

## 2018-02-19 PROCEDURE — 87070 CULTURE OTHR SPECIMN AEROBIC: CPT

## 2018-02-19 PROCEDURE — 87181 SC STD AGAR DILUTION PER AGT: CPT

## 2018-02-19 PROCEDURE — 28825 PARTIAL AMPUTATION OF TOE: CPT

## 2018-02-19 PROCEDURE — 82948 REAGENT STRIP/BLOOD GLUCOSE: CPT

## 2018-02-19 RX ADMIN — INSULIN LISPRO SCH UNITS: 100 INJECTION, SOLUTION INTRAVENOUS; SUBCUTANEOUS at 17:57

## 2018-02-19 RX ADMIN — INSULIN LISPRO SCH UNITS: 100 INJECTION, SOLUTION INTRAVENOUS; SUBCUTANEOUS at 11:36

## 2018-02-19 NOTE — PCM.SURG1
Surgeon's Initial Post Op Note





- Surgeon's Notes


Surgeon: Dr. Pineda


Assistant: KWASI Rolon PGY1


Type of Anesthesia: IV Sedation, Local (10cc 2% lidocaine plain)


Anesthesia Administered By: Dr. Menard


Pre-Operative Diagnosis: Left 5th digit osteomyelitis


Operative Findings: See operative report.  Materials: 3-0 vicryl, 3-0 nylon


Post-Operative Diagnosis: Left 5th digit osteomyelitis


Operation Performed: Left 5th digit amputation


Specimen/Specimens Removed: 1) Left foot wound culture 2) Left 5th digit


Estimated Blood Loss: EBL {In ML}: 5


Blood Products Given: N/A


Drains Used: No Drains


Post-Op Condition: Good


Date of Surgery/Procedure: 02/19/18


Time of Surgery/Procedure: 09:05

## 2018-02-19 NOTE — RAD
PROCEDURE:  Left Foot Radiographs.



HISTORY:

s/p left 5th digit amputation  



COMPARISON:

None.



FINDINGS:



BONES:

Postop changes are seen with amputation of the 5th toe. The foot is 

otherwise unremarkable 



JOINTS:

Normal. 



SOFT TISSUES:

Normal. 



OTHER FINDINGS:

None.



IMPRESSION:

Postop changes are seen with amputation of the 5th toe. The foot is 

otherwise unremarkable

## 2018-02-19 NOTE — HP
HISTORY OF PRESENT ILLNESS:  I was called down to the recovery room status

post surgery by Dr. Pineda.  He had a procedure done of PVD, gangrene of

the left fifth toe and amputation.  He has a fairly much of noncompliant

patient.  He is a 47-year-old white male, morbidly obese with peripheral

vascular disease, left fifth toe gangrene, which was just amputated; CHF,

hyperlipidemia, hypertension.  He has got diabetes which he is very

noncompliant with; arthritis, bronchitis.  He had an MI x2 with stroke with

no residual effects, pneumonia, peripheral edema, MRSA of the left foot,

PVD, left heel wound.  His mom had breast cancer.  He has had cardiac

stents x3, femoral angiography 01/19/2018 with stent placement and

colonoscopy.  He is still smoking cigarettes 1 pack per day for 25 years. 

He says he quit a month ago, he has not.  He still drinks alcohol.  He is

morbidly obese, does not change his diet yet.  He has had 3 cardiac stents,

hypertension, strokes, MI x2.  Both father and brother with heart attacks. 

He is so noncompliant.  He is very frustrating.  He has had chest pain.  No

edema of the legs.  Alert and oriented x3.  He understands everything.  He

knows what he supposed to do, just does not do it.  He is at high risk to

fall.  He does have arthritis.  No paralysis.  No depression or anxiety. 

He had multiple labs.



REVIEW OF SYSTEMS

No change in vision, no hearing changes, all chronic.  No sore throat.  No

chest pain or shortness of breath.  No abdominal pain.  Extremities, he has

got dealing with his foot, left heel ulcer and left toe amputation.



PHYSICAL EXAMINATION

VITAL SIGNS:  He has a 98.4 temperature, 69 pulse, 157/88 blood pressure,

14 respiratory rate and 95% O2 sat.

HEENT:  His head is atraumatic, normocephalic.  Throat is moist.

NECK:  Supple.

HEART:  Regular rate.

LUNGS:  Decreased breath sounds, but clear.

ABDOMEN:  Soft, morbidly obese.  Positive bowel sounds.

EXTREMITIES:  Left  wrapped up.



He is very sluggish from anesthesia.  He is in recovery room.  He has no

labs; order some labs.  We will get his medicines back on-board and we will

continue postop care as per Dr. Pineda.  We will watch him quite closely. 

I will also call Infectious Disease for IV antibiotics.





__________________________________________

Jair Castañeda DO



DD:  02/19/2018 10:51:52

DT:  02/19/2018 10:54:56

Job # [id number]





[email or web address]

## 2018-02-19 NOTE — OP
PROCEDURE DATE:  02/19/2018



SURGEON:  Jacqueline Pineda DPM



ASSISTANT:  Mireille Rolon DPM, PGY-1



ANESTHESIOLOGIST:  Ken Menard MD.



ANESTHESIA:  IV sedation with local, 10 mL of 2% lidocaine plain.



PREOPERATIVE DIAGNOSIS:  Left fifth digit osteomyelitis.



POSTOPERATIVE DIAGNOSIS:  Left fifth digit osteomyelitis.



PROCEDURE:  Left fifth digit amputation.



INDICATION:  The patient is a 47-year-old male with the above diagnosis. 

The patient has exhausted conservative treatment at this time and now requests

surgical intervention.  The patient signed the consent after careful

explanation of risks, benefits, alternatives, and complications of the

procedure and wishes to proceed.  No guarantees were given nor implied.



PREPARATION:  The patient was taken to the operating room and placed on the

operating room table in a supine position.  Time-out was performed for

identification of the correct patient and procedure.  After the induction

of IV sedation, the patient received a total of 10 mL of 2% lidocaine plain

in a proximal V-type fashion.  Once local anesthesia was achieved, the left

foot was then prepped and draped in normal sterile manner and the procedure

began.



DESCRIPTION OF PROCEDURE:  LEFT FOOT FIFTH DIGIT AMPUTATION:  Attention was

directed to the left fifth digit where it was noted to have demarcated dark

necrotic changes. Utilizing a #15 blade, a dorsolaterally based

racquet-type incision was made from the distal aspect of the fifth

metatarsal extending circumferentially at the level of the proximal

interphalangeal joint.  The incision was extended through subcutaneous

tissue down to the level of bone.  Using a bone clamp to stabilize the

distal aspect of the fifth digit, the fifth digit was then disarticulated at

the level of the proximal interphalangeal joint.  The specimen was then

passed from the operative field to be sent to pathology.  Using a crown and

collar, the fifth proximal phalanx was disarticulated at the level of the

metatarsophalangeal joint, passed off the operative field to be sent to

pathology.  At this time, a deep wound culture was obtained.  Next, using a 
bone cutter, the

fifth metatarsal articular cartilage was resected and passed off the

operative field to be sent to pathology.  The remaining fifth metatarsal

distal aspect was then smoothed using a Be nasal bone rasp.  Using a

fresh #15 blade, skin margins were debulked for a suitable flap for closure.  
The surgical

site was then irrigated with copious amounts of normal sterile saline. 

Subcutaneous layers were reapproximated using 3-0 Vicryl and skin layers

were then reapproximated using 3-0 nylon using a simple interrupted suture

technique.  Adaptic was applied over the incision site and the left foot

was then dressed with sterile gauze, ABDs, and Kerlix.  



POSTOPERATIVE CONDITION: The patient

tolerated the anesthesia and procedure well and was escorted to the

recovery room with vital signs stable and neurovascular status intact to

the left foot.  The patient is to remain weightbearing as tolerated to the

left heel in a surgical shoe and patient will be admitted for an extended

recovery.  Podiatry will continue to follow patient while in-house.





__________________________________________

Mireille Rolon DPM





__________________________________________

Jacqueline Pineda DPM



DD:  02/19/2018 9:20:29

DT:  02/19/2018 20:12:47

Job # [id number]



ROSA

## 2018-08-01 ENCOUNTER — HOSPITAL ENCOUNTER (OUTPATIENT)
Dept: HOSPITAL 42 - ED | Age: 48
Setting detail: OBSERVATION
LOS: 2 days | Discharge: HOME | End: 2018-08-03
Attending: FAMILY MEDICINE | Admitting: FAMILY MEDICINE
Payer: COMMERCIAL

## 2018-08-01 VITALS — BODY MASS INDEX: 50 KG/M2

## 2018-08-01 DIAGNOSIS — Z95.5: ICD-10-CM

## 2018-08-01 DIAGNOSIS — R18.8: ICD-10-CM

## 2018-08-01 DIAGNOSIS — I16.0: ICD-10-CM

## 2018-08-01 DIAGNOSIS — I42.0: ICD-10-CM

## 2018-08-01 DIAGNOSIS — I50.21: ICD-10-CM

## 2018-08-01 DIAGNOSIS — E87.6: ICD-10-CM

## 2018-08-01 DIAGNOSIS — K21.9: ICD-10-CM

## 2018-08-01 DIAGNOSIS — K59.00: ICD-10-CM

## 2018-08-01 DIAGNOSIS — E55.9: ICD-10-CM

## 2018-08-01 DIAGNOSIS — Z79.4: ICD-10-CM

## 2018-08-01 DIAGNOSIS — I13.0: Primary | ICD-10-CM

## 2018-08-01 DIAGNOSIS — E66.01: ICD-10-CM

## 2018-08-01 DIAGNOSIS — I25.118: ICD-10-CM

## 2018-08-01 DIAGNOSIS — E78.00: ICD-10-CM

## 2018-08-01 DIAGNOSIS — Z91.19: ICD-10-CM

## 2018-08-01 DIAGNOSIS — E11.22: ICD-10-CM

## 2018-08-01 DIAGNOSIS — Z79.82: ICD-10-CM

## 2018-08-01 DIAGNOSIS — I25.5: ICD-10-CM

## 2018-08-01 DIAGNOSIS — N18.3: ICD-10-CM

## 2018-08-01 DIAGNOSIS — Z79.02: ICD-10-CM

## 2018-08-01 DIAGNOSIS — D64.9: ICD-10-CM

## 2018-08-01 DIAGNOSIS — Z91.11: ICD-10-CM

## 2018-08-01 DIAGNOSIS — I25.2: ICD-10-CM

## 2018-08-01 DIAGNOSIS — N17.9: ICD-10-CM

## 2018-08-01 DIAGNOSIS — Z87.891: ICD-10-CM

## 2018-08-01 LAB
ALBUMIN SERPL-MCNC: 2.9 G/DL (ref 3–4.8)
ALBUMIN/GLOB SERPL: 0.8 {RATIO} (ref 1.1–1.8)
ALT SERPL-CCNC: 24 U/L (ref 7–56)
APTT BLD: 33 SECONDS (ref 25.1–36.5)
AST SERPL-CCNC: 15 U/L (ref 17–59)
BASOPHILS # BLD AUTO: 0.01 K/MM3 (ref 0–2)
BASOPHILS NFR BLD: 0.1 % (ref 0–3)
BNP SERPL-MCNC: (no result) PG/ML (ref 0–450)
BUN SERPL-MCNC: 19 MG/DL (ref 7–21)
CALCIUM SERPL-MCNC: 8 MG/DL (ref 8.4–10.5)
EOSINOPHIL # BLD: 0.3 10*3/UL (ref 0–0.7)
EOSINOPHIL NFR BLD: 2.2 % (ref 1.5–5)
ERYTHROCYTE [DISTWIDTH] IN BLOOD BY AUTOMATED COUNT: 15.9 % (ref 11.5–14.5)
GFR NON-AFRICAN AMERICAN: 38
GRANULOCYTES # BLD: 10.01 10*3/UL (ref 1.4–6.5)
GRANULOCYTES NFR BLD: 80.2 % (ref 50–68)
HGB BLD-MCNC: 12.3 G/DL (ref 14–18)
INR PPP: 1.09
LYMPHOCYTES # BLD: 1.4 10*3/UL (ref 1.2–3.4)
LYMPHOCYTES NFR BLD AUTO: 11.4 % (ref 22–35)
MCH RBC QN AUTO: 22.7 PG (ref 25–35)
MCHC RBC AUTO-ENTMCNC: 30.5 G/DL (ref 31–37)
MCV RBC AUTO: 74.2 FL (ref 80–105)
MONOCYTES # BLD AUTO: 0.8 10*3/UL (ref 0.1–0.6)
MONOCYTES NFR BLD: 6.1 % (ref 1–6)
PLATELET # BLD: 290 10^3/UL (ref 120–450)
PMV BLD AUTO: 9.3 FL (ref 7–11)
PROTHROMBIN TIME: 12.4 SECONDS (ref 9.4–12.5)
RBC # BLD AUTO: 5.43 10^6/UL (ref 3.5–6.1)
TROPONIN I SERPL-MCNC: < 0.01 NG/ML
WBC # BLD AUTO: 12.5 10^3/UL (ref 4.5–11)

## 2018-08-01 PROCEDURE — 78472 GATED HEART PLANAR SINGLE: CPT

## 2018-08-01 PROCEDURE — 83880 ASSAY OF NATRIURETIC PEPTIDE: CPT

## 2018-08-01 PROCEDURE — 86706 HEP B SURFACE ANTIBODY: CPT

## 2018-08-01 PROCEDURE — 96376 TX/PRO/DX INJ SAME DRUG ADON: CPT

## 2018-08-01 PROCEDURE — 85610 PROTHROMBIN TIME: CPT

## 2018-08-01 PROCEDURE — 84484 ASSAY OF TROPONIN QUANT: CPT

## 2018-08-01 PROCEDURE — 82306 VITAMIN D 25 HYDROXY: CPT

## 2018-08-01 PROCEDURE — 99285 EMERGENCY DEPT VISIT HI MDM: CPT

## 2018-08-01 PROCEDURE — 82088 ASSAY OF ALDOSTERONE: CPT

## 2018-08-01 PROCEDURE — 86038 ANTINUCLEAR ANTIBODIES: CPT

## 2018-08-01 PROCEDURE — 86803 HEPATITIS C AB TEST: CPT

## 2018-08-01 PROCEDURE — 86705 HEP B CORE ANTIBODY IGM: CPT

## 2018-08-01 PROCEDURE — 83735 ASSAY OF MAGNESIUM: CPT

## 2018-08-01 PROCEDURE — 83835 ASSAY OF METANEPHRINES: CPT

## 2018-08-01 PROCEDURE — 84443 ASSAY THYROID STIM HORMONE: CPT

## 2018-08-01 PROCEDURE — 85730 THROMBOPLASTIN TIME PARTIAL: CPT

## 2018-08-01 PROCEDURE — 82043 UR ALBUMIN QUANTITATIVE: CPT

## 2018-08-01 PROCEDURE — 86225 DNA ANTIBODY NATIVE: CPT

## 2018-08-01 PROCEDURE — 82570 ASSAY OF URINE CREATININE: CPT

## 2018-08-01 PROCEDURE — 84244 ASSAY OF RENIN: CPT

## 2018-08-01 PROCEDURE — 76700 US EXAM ABDOM COMPLETE: CPT

## 2018-08-01 PROCEDURE — 83540 ASSAY OF IRON: CPT

## 2018-08-01 PROCEDURE — 81001 URINALYSIS AUTO W/SCOPE: CPT

## 2018-08-01 PROCEDURE — 93005 ELECTROCARDIOGRAM TRACING: CPT

## 2018-08-01 PROCEDURE — 83036 HEMOGLOBIN GLYCOSYLATED A1C: CPT

## 2018-08-01 PROCEDURE — 86703 HIV-1/HIV-2 1 RESULT ANTBDY: CPT

## 2018-08-01 PROCEDURE — 71045 X-RAY EXAM CHEST 1 VIEW: CPT

## 2018-08-01 PROCEDURE — 83970 ASSAY OF PARATHORMONE: CPT

## 2018-08-01 PROCEDURE — 36415 COLL VENOUS BLD VENIPUNCTURE: CPT

## 2018-08-01 PROCEDURE — 86160 COMPLEMENT ANTIGEN: CPT

## 2018-08-01 PROCEDURE — 85025 COMPLETE CBC W/AUTO DIFF WBC: CPT

## 2018-08-01 PROCEDURE — 84300 ASSAY OF URINE SODIUM: CPT

## 2018-08-01 PROCEDURE — 83550 IRON BINDING TEST: CPT

## 2018-08-01 PROCEDURE — 80053 COMPREHEN METABOLIC PANEL: CPT

## 2018-08-01 PROCEDURE — 84156 ASSAY OF PROTEIN URINE: CPT

## 2018-08-01 PROCEDURE — 82728 ASSAY OF FERRITIN: CPT

## 2018-08-01 PROCEDURE — 85027 COMPLETE CBC AUTOMATED: CPT

## 2018-08-01 PROCEDURE — 87340 HEPATITIS B SURFACE AG IA: CPT

## 2018-08-01 PROCEDURE — 96374 THER/PROPH/DIAG INJ IV PUSH: CPT

## 2018-08-01 PROCEDURE — 82948 REAGENT STRIP/BLOOD GLUCOSE: CPT

## 2018-08-01 RX ADMIN — POTASSIUM CHLORIDE SCH MEQ: 20 TABLET, EXTENDED RELEASE ORAL at 09:53

## 2018-08-01 RX ADMIN — INSULIN DETEMIR SCH UNITS: 100 INJECTION, SOLUTION SUBCUTANEOUS at 10:07

## 2018-08-01 RX ADMIN — INSULIN HUMAN SCH UNIT: 100 INJECTION, SOLUTION PARENTERAL at 12:25

## 2018-08-01 RX ADMIN — INSULIN HUMAN SCH: 100 INJECTION, SOLUTION PARENTERAL at 22:20

## 2018-08-01 NOTE — RAD
Date of service: 



08/01/2018



HISTORY:

chest pain  



COMPARISON:

01/15/2018 



FINDINGS:



LUNGS:

No active pulmonary disease.



PLEURA:

No significant pleural effusion identified, no pneumothorax apparent.



CARDIOVASCULAR:

Moderate cardiomegaly.  Moderate vascular congestion



OSSEOUS STRUCTURES:

No significant abnormalities.



VISUALIZED UPPER ABDOMEN:

Normal.



OTHER FINDINGS:

None.



IMPRESSION:

Moderate cardiomegaly.  Moderate vascular congestion

## 2018-08-01 NOTE — ED PDOC
Arrival/HPI





- General


Chief Complaint: Shortness Of Breath


Time Seen by Provider: 08/01/18 02:13


Historian: Patient





- History of Present Illness


Narrative History of Present Illness (Text): 





08/01/18 02:30





A 47 year old, whose past medical history includes diabetes and CHF, presents 

to the emergency department for complaint of 1 week duration, worsening, 

shortness of breath. Patient states that he was seen by Dr. Castañeda yesterday 

for his symptoms and was prescribed Lasix, but patient was unable to refill his 

prescription yesterday. He was advised that if his symptoms worsened to come to 

the emergency department. Patient notes that when he lays down it becomes 

difficult for him to breathe. Patient denies fevers, chills, headache, dizziness

, chest pain, dyspnea on exertion, cough, abdominal pain, nausea, vomiting, 

diarrhea, back pain, neck pain, urinary/bowel changes, or any other complaint.








PMD: Dr. Castañeda








Time/Duration: Other (Yesterday)


Symptom Onset: Sudden


Symptom Course: Unchanged


Activities at Onset: Rest, Light


Context: Home





Past Medical History





- Provider Review


Nursing Documentation Reviewed: Yes





- Infectious Disease


Hx of Infectious Diseases: None





- Tetanus Immunization


Tetanus Immunization: Unknown





- Cardiac


Hx Cardiac Disorders: No





- Pulmonary


Hx Respiratory Disorders: No





- Neurological


Hx Neurological Disorder: No





- HEENT


Hx HEENT Disorder: No





- Renal


Hx Renal Disorder: No





- Endocrine/Metabolic


Hx Endocrine Disorders: Yes


Hx Diabetes Mellitus Type 2: Yes





- Hematological/Oncological


Hx Blood Disorders: No





- Integumentary


Hx Dermatological Disorder: No


Hx Cellulitis:  (periorbital)





- Musculoskeletal/Rheumatological


Hx Musculoskeletal Disorders: Yes





- Gastrointestinal


Hx Gastrointestinal Disorders: No





- Genitourinary/Gynecological


Hx Genitourinary Disorders: No





- Psychiatric


Hx Psychophysiologic Disorder: No


Hx Substance Use: No





- Surgical History


Hx Coronary Stent: Yes (X3)





- Anesthesia


Hx Anesthesia Reactions: No


Hx Malignant Hyperthermia: No





- Suicidal Assessment


Feels Threatened In Home Enviroment: No





Family/Social History





- Physician Review


Nursing Documentation Reviewed: Yes


Family/Social History: No Known Family HX


Smoking Status: Former Smoker


Hx Alcohol Use: Yes (OCCASIONAL BEER)


Hx Substance Use: No





Allergies/Home Meds


Allergies/Adverse Reactions: 


Allergies





No Known Allergies Allergy (Verified 08/01/18 01:48)


 








Home Medications: 


 Home Meds











 Medication  Instructions  Recorded  Confirmed


 


MetFORMIN [glucoPHAGE] 1,000 mg PO BID 10/24/16 08/01/18


 


Aspirin [Aspirin Chewable] 81 mg PO DAILY 10/29/16 08/01/18


 


Furosemide [Lasix] 40 mg PO DAILY 07/12/17 08/01/18


 


Atorvastatin [Lipitor] 40 mg PO DAILY 02/16/18 08/01/18


 


Carvedilol [Coreg] 25 mg PO BID 02/16/18 08/01/18


 


Clopidogrel [Plavix] 75 mg PO DAILY 02/16/18 08/01/18


 


Insulin Detemir [Levemir] 40 units SC DAILY 02/16/18 08/01/18


 


Insulin Lispro [Humalog Kwikpen 12 units SC AC 02/16/18 08/01/18





U-100]   


 


Meclizine [Meclizine*] 25 mg PO DAILY PRN 02/16/18 08/01/18


 


Metoprolol Tartrate [Lopressor] 50 mg PO BID 02/16/18 08/01/18


 


amLODIPine [Norvasc] 2.5 mg PO DAILY 02/16/18 08/01/18














Review of Systems





- Physician Review


All systems were reviewed & negative as marked: Yes





- Review of Systems


Constitutional: absent: Fevers


Respiratory: SOB.  absent: Cough


Cardiovascular: absent: Chest Pain, LEW


Gastrointestinal: absent: Abdominal Pain, Stool Changes, Diarrhea, Nausea, 

Vomiting


Musculoskeletal: absent: Back Pain, Neck Pain


Neurological: absent: Headache, Dizziness





Physical Exam





- Physical Exam


Narrative Physical Exam (Text): 





08/01/18 02:35





Gen: VS reviewed, alert, well developed, well nourished, nontoxic, mild 

distress.


ENT: Normal pharynx.


Eye: EOMI, PERRL.


Neck: No JVD, supple, no adenopathy.


CV: Regular rate, regular rhythm, no rubs, no murmur, no gallops, S1, S2, 

pulses equal and strong.


Pulm: Crackles in bilateral lung bases.


Abd: Soft, nontender, no guarding, no rebound, no rigidity, normal bowel 

sounds. 


Ext: Pitting edema bilaterally extending up to his abdomen.


Skin: Good color, no rash, no cyanosis.


Psych: Responds appropriately to questions, normal affect.


Neuro: Oriented x 3, CN2-12 intact grossly, motor intact, sensation intact.





Vital Signs Reviewed: Yes


Vital Signs











  Temp Pulse Resp BP Pulse Ox


 


 08/01/18 03:03   87  18  171/95 H  97


 


 08/01/18 02:51    24   97


 


 08/01/18 02:45     188/124 H 


 


 08/01/18 02:00     160/88 H 


 


 08/01/18 01:50  98.4 F  94 H  22   96











Temperature: Afebrile


Blood Pressure: Normal


Pulse: Tachycardic


Respiratory Rate: Normal


Appearance: Positive for: Well-Appearing, Non-Toxic, Comfortable


Pain Distress: None


Mental Status: Positive for: Alert and Oriented X 3





Medical Decision Making


ED Course and Treatment: 





08/01/18 02:37





Impression:


A 47 year old male presents to the emergency department with a complaint of 1 

week duration, worsening shortness of breath.





Plan:


-- EKG


-- Chest X-ray 


-- Labs


-- Lasix


-- Reassess and disposition





Progress Notes:








08/01/18 05:58: Case discussed in detail with Dr. Castañeda who accepts patient to 

his service. Dr. Castañeda reports that the patient has a history of abdominal 

ascites and would like to consult Dr. Hoang Roman who has requested Abdomen and 

Pelvis Ultrasound and Abdomen/Pelvis CT for eventual abdominal paracentesis. 





08/01/18 07:06


patient was seen for clinical decomp chf, no sig resp distress, patient was 

given lasix in the ED and diuresed as per nursing report. patient remained 

stable throughout ED course





- Lab Interpretations


Lab Results: 








 08/01/18 02:13 





 08/01/18 02:13 





 Lab Results





08/01/18 02:13: Sodium 142, Potassium 2.8 L* D, Chloride 100, Carbon Dioxide 33

, Anion Gap 12, BUN 19, Creatinine 1.9 H, Est GFR ( Amer) 46, Est GFR (

Non-Af Amer) 38, Random Glucose 277 H, Calcium 8.0 L, Total Bilirubin 0.5, AST 

15 L D, ALT 24, Alkaline Phosphatase 89, Troponin I < 0.01, NT-Pro-B Natriuret 

Pep 56388 H, Total Protein 6.6, Albumin 2.9 L, Globulin 3.6, Albumin/Globulin 

Ratio 0.8 L


08/01/18 02:13: PT 12.4, INR 1.09, APTT 33.0


08/01/18 02:13: WBC 12.5 H, RBC 5.43, Hgb 12.3 L, Hct 40.3 L, MCV 74.2 L D, MCH 

22.7 L, MCHC 30.5 L, RDW 15.9 H, Plt Count 290, MPV 9.3, Gran % 80.2 H, Lymph % 

(Auto) 11.4 L, Mono % (Auto) 6.1 H, Eos % (Auto) 2.2, Baso % (Auto) 0.1, Gran # 

10.01 H, Lymph # (Auto) 1.4, Mono # (Auto) 0.8 H, Eos # (Auto) 0.3, Baso # (Auto

) 0.01








I have reviewed the lab results: Yes





- RAD Interpretation


Narrative RAD Interpretations (Text): 





08/01/18 03:25: Chest X-ray read and interpreted by me shows pulmonary edema. 


Radiology Orders: 








08/01/18 02:30


CHEST PORTABLE [RAD] Stat 














- EKG Interpretation


EKG Interpretation (Text): 





08/01/18 02:41


0148: sinus rhythm at 92 bpm, nml qrs, nml axis, low voltage, nonspecific t 

wave abn


Interpreted by ED Physician: Yes


Type: 12 lead EKG





- Medication Orders


Current Medication Orders: 











Discontinued Medications





Furosemide (Lasix)  40 mg IVP STAT STA


   Stop: 08/01/18 02:31


   Last Admin: 08/01/18 02:45  Dose: 40 mg





MAR Blood Pressure


 Document     08/01/18 02:45  SS  (Rec: 08/01/18 02:48  SS  [id number])


     Blood Pressure


      Blood Pressure (100//90)             188/124


IVP Administration


 Document     08/01/18 02:45  SS  (Rec: 08/01/18 02:48  SS  [id number])


     Charges for Administration


      # of IVP Administrations                   1





Potassium Chloride (K-Dur 20 Meq Er Tab)  40 meq PO STAT STA


   Stop: 08/01/18 03:27


   Last Admin: 08/01/18 04:04  Dose: 40 meq





Potassium Chloride (Potassium Chloride Oral Soln)  20 meq PO STAT STA


   Stop: 08/01/18 03:27


   Last Admin: 08/01/18 04:04  Dose: 20 meq











- Scribe Statement


The provider has reviewed the documentation as recorded by the Anton Frazier 





Provider Scribe Attestation:


All medical record entries made by the Scribe were at my direction and 

personally dictated by me. I have reviewed the chart and agree that the record 

accurately reflects my personal performance of the history, physical exam, 

medical decision making, and the department course for this patient. I have 

also personally directed, reviewed, and agree with the discharge instructions 

and disposition.








Disposition/Present on Arrival





- Present on Arrival


Any Indicators Present on Arrival: No


History of DVT/PE: No


History of Uncontrolled Diabetes: No


Urinary Catheter: No


History of Decub. Ulcer: No


History Surgical Site Infection Following: None





- Disposition


Have Diagnosis and Disposition been Completed?: Yes


Diagnosis: 


 CHF (congestive heart failure)





Disposition: HOSPITALIZED


Disposition Time: 06:25


Patient Plan: Admission


Patient Problems: 


 Current Active Problems











Problem Status Onset


 


CHF (congestive heart failure) Acute  











Condition: FAIR

## 2018-08-01 NOTE — CARD
--------------- APPROVED REPORT --------------





Date of service: 08/01/2018



EKG Measurement

Heart [id number]

IN [id number]

[id number]

[id number]

[id number]



<Conclusion>

Normal sinus rhythm

Low voltage QRS

Cannot rule out Anterior infarct, age undetermined

Abnormal ECG

## 2018-08-01 NOTE — HP
Copied To:  Jair Castañeda DO

Attending MD:  Jair Castañeda DO



HISTORY OF PRESENT ILLNESS:  I saw him in the office yesterday.  He had

stopped taking his medications, his Lasix, he has very much gained about 10

pounds from the last time I saw him, it is all water weight.  His abdomen

is morbidly distended with ascites and extremities have +4 pitting edema

bilaterally.  It is very uncomfortable walking.  He is a little bit short

of breath.  He is a 47-year-old noncompliant male with history of diabetes,

CHF, edema, who comes in finally to my office 24 hours later after I sent

him to the emergency room for evaluation, he was given IV Lasix.  I called

in Dr. Hoang Roman for possible paracentesis, also Dr. Malagon for diuresis. 

He had him back on his medication.  He is also a diabetic.  This is a sort

of a sudden change over 3 to 4 days.  He has had diabetes.  He has got

musculoskeletal disorders.  He has coronary stents x3 in the past.  He has

hypertension in the family.



SOCIAL HISTORY:  He is a former smoker, still drinks alcohol.  No substance

abuse.



ALLERGIES:  NO KNOWN DRUG ALLERGIES.



MEDICATIONS:  He is supposed to be on Glucophage, aspirin, Lasix, Lipitor,

Coreg, Plavix, Levemir, insulin, meclizine, Lopressor, Norvasc. 

Noncompliant.



REVIEW OF SYSTEMS:  He has no acute vision or hearing changes.  He has no

sore throat.  He has no chest pain or palpitations.  Very short of breath,

especially with going up the stairs or walking.  No wheezes or rhonchi. 

His abdomen is quite distended from fluid.  No tenderness.  No nausea,

vomiting, constipation, diarrhea.  Extremities are very swollen, hard for

him to walk around.  His skin is stretched.  No ulcers or anything like

that.



PHYSICAL EXAMINATION:

GENERAL:  He is alert and oriented x3.  Normal affect.  Not anxious.  He is

well nourished, mild distress.

VITAL SIGNS:  Temperature 98.4, 94 pulse, 22 respiratory rate, 188/124

blood pressure, 97% O2 sat on room air.

HEENT:  His head is atraumatic, normocephalic.  Extraocular muscles are

intact.  Pupils reactive to light.

NECK:  No JVD.

HEART:  Regular rate.  Normal S1 and S2.

LUNGS:  Decreased breath sounds bilaterally.  Crackles on the bases.

ABDOMEN:  Very distended with ascites, almost morbidly obese to look at

him.  No guarding, no rebound.  It is very rigid.

EXTREMITIES:  He has got +4/4 pitting edema in bilateral lower extremities

and his thighs.

SKIN:  Venous stasis in the lower extremities.  No apparent rashes or

ulcers.

NEUROLOGICAL:  He is alert and oriented x3.  Cranial nerves II through XII

grossly intact.  GCS is 15.

LYMPHATICS:  Thyroid midline.  No palpable appreciable lymphadenopathy.



LABORATORY DATA:  He has a 12.5 white count, could be stress, check it

tomorrow; 12.3 hemoglobin, 40.3 hematocrit, with 290 platelets.  He has a

sodium of 142; potassium is 2.8, low, was replaced in the ER; chloride 100,

carbon dioxide 33, anion gap is 12, BUN 90, creatinine 1.9.  GFR is 38,

random sugars is 277.  He will be on insulin coverage and put back on his

medications.  His BNP is pratibha high at 13,100. His protein total is 6.6,

albumin is 2.9.  He will be on Lasix 40 IV to help with the BNP.  His INR

is 1.09.



ASSESSMENT AND PLAN:  Tests are pending.  He has an abdominal ultrasound,

electrocardiogram and chest x-ray  pending.  He will have a consult with

Dr. Hoang Roman and Cardiology.  He will be on IV Lasix.  Hopefully he will

improve, and he is here with ascites, CHF, diabetes, much swollen legs and

diabetic.





__________________________________________

Jair Castañeda DO



DD:  08/01/2018 8:23:53

DT:  08/01/2018 8:26:13

Job # [id number]

## 2018-08-01 NOTE — CON
Copied To:  Hoang Malagon MD

Attending MD:  Hoang Malagon MD



DATE:  08/01/2018



HISTORY OF PRESENT ILLNESS:  The patient is a 47-year-old male who presents

with dyspnea and progressive pedal edema.



The patient has been noncompliant with dietary advice as well as cardiac

risk reduction program.



PAST MEDICAL HISTORY:  The patient's past medical history includes history

of an ischemic dilated cardiomyopathy, hypertension, diabetes mellitus and

obesity.  He underwent PTCA and stent of a 99% stenosis of the LAD in

10/2016.  His last evaluation revealed an ejection fraction that had

markedly improved.



However, he has been noncompliant with his diet, has not lost any weight.



SOCIAL HISTORY:  He has stopped smoking.



REVIEW OF SYSTEMS:  A 14-point review of systems are reviewed in detail. 

Marked dyspnea, pedal edema, increased abdominal girth, no chest pain and

no loss of consciousness.



PHYSICAL EXAMINATION:

VITAL SIGNS:  Blood pressure 150/69, heart rate in the 80s.

NECK:  Negative JVD.

LUNGS:  Decreased breath sounds bilaterally.

HEART:  Reveal S1, S2.

EXTREMITIES:  Marked chronic edematous changes.



EKG shows no acute changes.  Troponin is negative x1.  ProBNP is greater

than 13,000.  BUN and creatinine is 19 and 1.9 with a glucose of 277.



IMPRESSION:

1.  Acute systolic congestive heart failure.

2.  Stable angina.

3.  Coronary artery disease.

4.  Diabetes mellitus.

5.  Marked obesity.

6.  Pedal edema.

7.  Ascites.

8.  Noncompliance with medical advice.



Given these findings, I have discussed with the patient about his need for

change in diet.  He has stopped smoking, which is a good thing.



We will increase his Lasix to 40 b.i.d.  In addition, we will obtain a MUGA

scan to evaluate his LV function.





__________________________________________

Hoang Malagon MD







DD:  08/01/2018 14:21:58

DT:  08/01/2018 14:25:58

Job # [id number]

## 2018-08-01 NOTE — US
Date of service: 



08/01/2018



HISTORY:

ascites



COMPARISON:

None.



TECHNIQUE:

Sonographic evaluation of the abdomen.



FINDINGS:



LIVER:

Measures 28 cm.  Increased echogenicity of the liver parenchyma. No 

mass. No intrahepatic bile duct dilatation.



GALLBLADDER:

Supplied



COMMON BILE DUCT:

Measures  mm. No stones. No dilatation.



PANCREAS:

Unremarkable as visualized. No mass. No ductal dilatation.



RIGHT KIDNEY:

Measures cm. Normal echogenicity. No calculus, mass, or 

hydronephrosis.



LEFT KIDNEY:

Measures cm. Normal echogenicity. No calculus, mass, or 

hydronephrosis.



SPLEEN:

Normal in size and contour. No mass.



AORTA:

No aneurysmal dilatation. 



IVC:

Unremarkable. 



OTHER FINDINGS:

Extremity limited by large body habitus. 



IMPRESSION:

Extremity limited by large body habitus. Enlarged liver with 

heterogeneity suggesting fatty infiltration.  Gallbladder sludge. 

Recommend correlation with CT scan.

## 2018-08-01 NOTE — CARD
--------------- APPROVED REPORT --------------





Date of service: 08/01/2018



INDICATION

Congestive Heart Failure 

ASCITES



PROCEDURE

The above named patient recieved 30 millicuries of Tc99m tagged red 

blood cells intravenously.

After achieving equilibrium, gated imaging of 16/frame/cycle was 

performed utillizing Gamma camera interfaced with a digital computer 

and gated device.

Gated imaging was then performed in the left anterior oblique, 

anterior, and the left lateral projections.



Findings

Left Ventricle: The quality of the study is suboptimal due to limited 

and poor positioning.  The left ventricle is mildly enlarged in size 

with thickened myocardium. The right ventricle is normal in size. 

Wall motion study shows diffuse hypokinesis of the left ventricle.  

RV wall motion is normal.  The right atrium is dynamic. The remainder 

of the study is unremarkable.



Impressions

Mild to moderate LV dysfunction with diffuse hypokinesis.  LVEF = 

41%.  

LVH. LBBB.

Normal RV wall motion.

## 2018-08-02 VITALS — RESPIRATION RATE: 20 BRPM

## 2018-08-02 LAB
% IRON SATURATION: 10 % (ref 20–55)
ALBUMIN SERPL-MCNC: 2.9 G/DL (ref 3–4.8)
ALBUMIN/GLOB SERPL: 0.8 {RATIO} (ref 1.1–1.8)
ALT SERPL-CCNC: 23 U/L (ref 7–56)
APPEARANCE UR: CLEAR
AST SERPL-CCNC: 14 U/L (ref 17–59)
BILIRUB UR-MCNC: NEGATIVE MG/DL
BUN SERPL-MCNC: 23 MG/DL (ref 7–21)
C3 SERPL-MCNC: 108 MG/DL (ref 88–165)
C4 SERPL-MCNC: 19.2 MG/DL (ref 14–44)
CALCIUM SERPL-MCNC: 7.9 MG/DL (ref 8.4–10.5)
COLOR UR: YELLOW
CREATININE,RANDOM URINE: 127 MG/DL
ERYTHROCYTE [DISTWIDTH] IN BLOOD BY AUTOMATED COUNT: 16.1 % (ref 11.5–14.5)
FERRITIN SERPL-MCNC: 32.5 NG/ML
GFR NON-AFRICAN AMERICAN: 34
GLUCOSE UR STRIP-MCNC: 250 MG/DL
HEPATITIS B CORE AB: NEGATIVE
HGB BLD-MCNC: 11.6 G/DL (ref 14–18)
HIV 1&2 ANTIBODY: NEGATIVE
IRON SERPL-MCNC: 29 UG/DL (ref 45–180)
LEUKOCYTE ESTERASE UR-ACNC: NEGATIVE LEU/UL
MCH RBC QN AUTO: 22.3 PG (ref 25–35)
MCHC RBC AUTO-ENTMCNC: 29.8 G/DL (ref 31–37)
MCV RBC AUTO: 74.8 FL (ref 80–105)
PH UR STRIP: 6 [PH] (ref 4.7–8)
PLATELET # BLD: 258 10^3/UL (ref 120–450)
PMV BLD AUTO: 9 FL (ref 7–11)
PROT UR STRIP-MCNC: >=300 MG/DL
RBC # BLD AUTO: 5.2 10^6/UL (ref 3.5–6.1)
RBC # UR STRIP: (no result) /UL
RBC #/AREA URNS HPF: (no result) /HPF (ref 0–2)
SP GR UR STRIP: >= 1.03 (ref 1–1.03)
TIBC SERPL-MCNC: 298 UG/DL (ref 261–462)
UROBILINOGEN UR STRIP-ACNC: 0.2 E.U./DL
WBC # BLD AUTO: 11.5 10^3/UL (ref 4.5–11)

## 2018-08-02 RX ADMIN — INSULIN HUMAN SCH: 100 INJECTION, SOLUTION PARENTERAL at 11:57

## 2018-08-02 RX ADMIN — POTASSIUM CHLORIDE SCH MEQ: 20 TABLET, EXTENDED RELEASE ORAL at 18:17

## 2018-08-02 RX ADMIN — INSULIN HUMAN SCH UNIT: 100 INJECTION, SOLUTION PARENTERAL at 17:13

## 2018-08-02 RX ADMIN — POTASSIUM CHLORIDE SCH MEQ: 20 TABLET, EXTENDED RELEASE ORAL at 09:38

## 2018-08-02 RX ADMIN — INSULIN DETEMIR SCH UNITS: 100 INJECTION, SOLUTION SUBCUTANEOUS at 09:39

## 2018-08-02 RX ADMIN — INSULIN HUMAN SCH: 100 INJECTION, SOLUTION PARENTERAL at 08:27

## 2018-08-02 RX ADMIN — INSULIN HUMAN SCH: 100 INJECTION, SOLUTION PARENTERAL at 21:58

## 2018-08-02 NOTE — PN
Copied To:  Jair Castañeda DO

Attending MD:  Jair Castañeda DO



DATE:  08/02/2018



SUBJECTIVE:  I saw him sitting up in bed.  He has been diuresing well with

the Lasix.  He is urinating a lot.  He is on aspirin, Coreg, Glucophage,

insulin coverage, potassium replacement, Lasix IV every 12 hours, Levemir,

Lipitor, Lopressor, Norvasc, and potassium.  He is feeling little bit

better, less shortness of breath.



He was seen by Dr. Hoang Roman he said his sinusitis issue resolved,

adipose and he has got to  have his liver checked out, so I called in GI. 

I will continue with diuresing as per Cardiology.



PHYSICAL EXAMINATION:

VITAL SIGNS:  He has a 97.8 temperature, 67 pulse, 140/66 blood pressure,

20 respiratory rate, 99% O2 sat on room air.

HEENT:  Head is atraumatic, normocephalic.

HEART:  Regular rate.

LUNGS:  Decreased breath sounds, but clear.

ABDOMEN:  Morbidly obese, still distended, but it is not ascites.

EXTREMITIES:  +4/4 pitting edema little better than yesterday.



LABORATORY DATA: He has a 11.5 white count, 11.6 hemoglobin, 38.9

hematocrit with a 258 platelets.  INR is 1.09.  He has a 143 sodium,

potassium is 3.3, I will give him more potassium.  BUN 23, creatinine 2.1,

GFR is 34.  I called in Renal, glucose is 132.  Calcium is 7.9.  I gave him

some potassium.  AST is 14, ALT is 23, alk phos 32, troponin I is less than

0.01.  His BNP is down to 9000.  Total protein 6.5.



ASSESSMENT AND PLAN:  He is being seen by Cardiology.  He has acute

systolic congestive heart failure.  He has diabetes.  Dr. Hoang Roman wants

his liver assessed.  He has coronary artery disease and now he has had

acute kidney injury.  We will follow and check his labs tomorrow.





__________________________________________

Jair Castañeda DO



DD:  08/02/2018 8:32:12

DT:  08/02/2018 11:36:31

Job # [id number]

## 2018-08-02 NOTE — CP.PCM.CON
History of Present Illness





- History of Present Illness


History of Present Illness: 


Nephrology Consultation Note:





Assessment: stable


sys CHF exacerbation with fluid overload


uncontrolled severe HTN with urgency, hypokalemia


POLY ? cardiorenal/abdomen compartment syndrome, hemodynamic due to BP 

fluctuations


CKD stage 3 with ? proteinuria ? Due to DM/HTN


Diabetic (E11.22) and HTN kidney disease (I12.9)


morbid Obesity


Anemia


possible obesity hypovent syndrome with chronic Co2 retention


ex smoker





Plan


no acute need for renal replacement therapy at this time


Hypertension control with meds as ordered. pt not on RAAS blocker, will add 

once volume status better optimized


Monitor I/O, daily weights and renal function while in hospital


oral fluid restriction. increase lasix to 80 mg bid and add metolazone 2.5 mg/

day. oral fluid restriction to 1000 ml/day


supplement KDUR


metformin preferably be d/c since serum cr 2.1 and hx of CHF. please consider 

alternative agent for glycemic control


CHF optimization as per cardiology





work up for kidney disease and secondary HTN as ordered





Dose meds/antibiotics for reduced GFR. Avoid fleets enema/magnesium based 

laxatives. Avoid nephrotoxins/NSAIDs/ iodinated contrast (unless needed 

emergently)


Glycemic control, renal diet. pt need to loose weight, follow diet control/

exercise and lifestyle modifications


Further work up for as per primary team.





Thanks for allowing me to participate in care of your patient. Will follow with 

you. Please call if any Qs. had d/w team


Dr Markel Rojas


Office: [phone] Fax: [phone]





CC; legs swelling and SOB


reason for consult: CKD


HPI: Pt is a 46 y/o M with hx of diabetes Mellitus ( >30 years), HTN (years), 

CKD stage 3 (with cr 1.7-1.8), morbid obesity, possible obesity hypovent 

syndrome with chronic Co2 retention, chronic sys CHF, ex smoker presented to 

hospital with complaints of SOB and worsening leg swelling, being managed for 

CHF exacerbation and renal consult for CKD management. pt feels better now


Denies chest pain, palpitation, improved shortness of breath, reports chronic 

leg swelling but better now


Denies OTC/herbal meds/NSAIDs


No recent iodinated contrast exposure. 





ROS: denies CP/nausea/vomitting. improved SOB. no nausea denies pain abdomen. 

denies urine complaints 


rest other negative except as mentioned in HPI. 





Physical Examination:   


General Appearance: Comfortable, in no acute respiratory distress, co-

operative. morbidly obese


Vitals reviewed and noted as below


Head; Atraumatic, normocephalic


ENT: no ulcers no thrush. Tongue is midline. Oropharynx: no rash or ulcers.


EYES: b/l PERRLA


Neck; supple no lymphadenopathy, no thyromegaly or bruit


Lungs: Normal respiratory rate/effort. Breath sounds decreased at bases


Heart: Normal rate. s1s2 normal. No rub or gallop. 


Extremities: 3+ edema. No varicose veins. chronic venous stasis changes in leg 

noted


Neurological: Patient is awake alert and follow commands no focal deficit


Skin: dry and warm. Normal turgor. No rash. Palpitation: Normal elasticity for 

age


Abdomen: Abdomen is distended and appears tense. Bowel sounds +. There is no 

abdominal tenderness, no guarding/rigidity or organomegaly but limited exam. 

has abdomen wall edema


Psych: normal insight. normal affect/mood


MSK: no specific joint tenderness or swelling. Digits and nails normal, no 

deformity


: kidney  not palpable. bladder not distended 





Labs/imaging/EKG reviewed.


Past medical history, past surgical history, social history, allergy reviewed 

and noted as below


Family hx; no hx of CKD. non contributory





abdomen sonogram shows normal kidneys








Past Patient History





- Infectious Disease


Hx of Infectious Diseases: None





- Tetanus Immunizations


Tetanus Immunization: Unknown





- Past Medical History & Family History


Past Medical History?: No





- Past Social History


Smoking Status: Former Smoker





- CARDIAC


Hx Cardiac Disorders: Yes (mi x 1 feb or march 2017)


Hx Congestive Heart Failure: Yes


Hx Hypercholesterolemia: Yes


Hx Peripheral Edema: Yes (b/l thighs down to b/l feet +2 pitting)


Other/Comment: circulation problems, chest pain, pedal edema, femoral angiogram 

1/18/18 with stent, swelling +2 edema to both hands and abd





- PULMONARY


Hx Respiratory Disorders: Yes


Hx Bronchitis: Yes


Hx Pneumonia:  (pt denies)





- NEUROLOGICAL


Hx Neurological Disorder: Yes


HX Cerebrovascular Accident: Yes ("after my heart attack" pt stated no residual)


Hx Dizziness: Yes (vertigo)


Other/Comment: no feeling in hands and feet, difficulty walking due to no 

feeling in feet





- HEENT


Hx HEENT Problems: Yes


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





- RENAL


Hx Chronic Kidney Disease: No





- ENDOCRINE/METABOLIC


Hx Endocrine Disorders: Yes


Hx Diabetes Mellitus Type 2: Yes





- HEMATOLOGICAL/ONCOLOGICAL


Hx Blood Disorders: Yes


Other/Comment: mrsa left foot 2/19/18, mrsa left foot, left heel wound 1/16/18, 

mrsa left eye 10/13/15





- INTEGUMENTARY


Hx Dermatological Problems: Yes


Other/Comment: periorbital cellulitis healed, 5th toe left foot amputated 2/19/ 18 dry crustation over site, multiple lumps to ble from weeping edema that is 

now dry, tight skin both legs feet to thighs, leathery red and brown skin ble, 

dry thick crusty nails both feet, brown dry skin around toes, deep red burn to 

right thumb, +mrsa left foot heel wound healed





- MUSCULOSKELETAL/RHEUMATOLOGICAL


Hx Falls: No





- GASTROINTESTINAL


Hx Gastrointestinal Disorders: Yes (obese)


Other/Comment: abd firm and distended





- GENITOURINARY/GYNECOLOGICAL


Hx Genitourinary Disorders: No





- PSYCHIATRIC


Hx Substance Use: No





- SURGICAL HISTORY


Hx Surgeries: Yes (CARD STENTS X 3, FEMORAL ANGIO 1/19/18 WITH STENT)


Hx Coronary Stent: Yes (X3)


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





- ANESTHESIA


Hx Anesthesia Reactions: No


Hx Malignant Hyperthermia: No





Meds


Allergies/Adverse Reactions: 


 Allergies











Allergy/AdvReac Type Severity Reaction Status Date / Time


 


No Known Allergies Allergy   Verified 08/01/18 01:48














- Medications


Medications: 


 Current Medications





Amlodipine Besylate (Norvasc)  2.5 mg PO DAILY [facility]


   Last Admin: 08/02/18 09:38 Dose:  2.5 mg


Aspirin (Aspirin Chewable)  81 mg PO DAILY [facility]


   Last Admin: 08/02/18 09:38 Dose:  81 mg


Atorvastatin Calcium (Lipitor)  40 mg PO DAILY [facility]


   Last Admin: 08/02/18 09:38 Dose:  40 mg


Calcium Carbonate (Oscal)  500 mg PO DAILY [facility]


   Last Admin: 08/02/18 09:38 Dose:  500 mg


Carvedilol (Coreg)  25 mg PO BID [facility]


   Last Admin: 08/02/18 09:38 Dose:  25 mg


Clopidogrel Bisulfate (Plavix)  75 mg PO DAILY [facility]


   Last Admin: 08/02/18 09:38 Dose:  75 mg


Furosemide (Lasix)  40 mg PO BID [facility]


   Last Admin: 08/02/18 09:49 Dose:  Not Given


Insulin Detemir (Levemir)  40 unit SC DAILY [facility]


   Last Admin: 08/02/18 09:39 Dose:  40 units


Insulin Human Regular (Humulin R Med)  [facility]


   PRN Reason: Protocol


   Last Admin: 08/02/18 11:57 Dose:  Not Given


Metformin HCl (Glucophage)  1,000 mg PO BID [facility]


   Last Admin: 08/02/18 09:38 Dose:  1,000 mg


Metoprolol Tartrate (Lopressor)  50 mg PO BID [facility]


   Last Admin: 08/02/18 09:38 Dose:  50 mg


Potassium Chloride (K-Dur 20 Meq Er Tab)  20 meq PO DAILY [facility]


   Last Admin: 08/02/18 09:38 Dose:  20 meq











Results





- Vital Signs


Recent Vital Signs: 


 Last Vital Signs











Temp  98 F   08/02/18 12:00


 


Pulse  65   08/02/18 12:00


 


Resp  18   08/02/18 12:00


 


BP  135/63   08/02/18 12:00


 


Pulse Ox  99   08/02/18 06:00














- Labs


Result Diagrams: 


 08/02/18 06:30





 08/02/18 06:30


Labs: 


 Laboratory Results - last 24 hr











  08/01/18 08/01/18 08/02/18





  16:42 21:36 06:30


 


WBC    11.5 H


 


RBC    5.20


 


Hgb    11.6 L


 


Hct    38.9 L


 


MCV    74.8 L


 


MCH    22.3 L


 


MCHC    29.8 L


 


RDW    16.1 H


 


Plt Count    258


 


MPV    9.0


 


Sodium   


 


Potassium   


 


Chloride   


 


Carbon Dioxide   


 


Anion Gap   


 


BUN   


 


Creatinine   


 


Est GFR ( Amer)   


 


Est GFR (Non-Af Amer)   


 


POC Glucose (mg/dL)  141 H  126 H 


 


Random Glucose   


 


Hemoglobin A1c   


 


Calcium   


 


Total Bilirubin   


 


AST   


 


ALT   


 


Alkaline Phosphatase   


 


NT-Pro-B Natriuret Pep   


 


Total Protein   


 


Albumin   


 


Globulin   


 


Albumin/Globulin Ratio   














  08/02/18 08/02/18 08/02/18





  06:30 06:30 07:19


 


WBC   


 


RBC   


 


Hgb   


 


Hct   


 


MCV   


 


MCH   


 


MCHC   


 


RDW   


 


Plt Count   


 


MPV   


 


Sodium  143  


 


Potassium  3.3 L  


 


Chloride  101  


 


Carbon Dioxide  34 H  


 


Anion Gap  11  


 


BUN  23 H  


 


Creatinine  2.1 H  


 


Est GFR ( Amer)  41  


 


Est GFR (Non-Af Amer)  34  


 


POC Glucose (mg/dL)    130 H


 


Random Glucose  132 H  


 


Hemoglobin A1c   12.1 H D 


 


Calcium  7.9 L  


 


Total Bilirubin  0.6  


 


AST  14 L  


 


ALT  23  


 


Alkaline Phosphatase  82  


 


NT-Pro-B Natriuret Pep  9060 H  


 


Total Protein  6.5  


 


Albumin  2.9 L  


 


Globulin  3.6  


 


Albumin/Globulin Ratio  0.8 L  














  08/02/18





  11:37


 


WBC 


 


RBC 


 


Hgb 


 


Hct 


 


MCV 


 


MCH 


 


MCHC 


 


RDW 


 


Plt Count 


 


MPV 


 


Sodium 


 


Potassium 


 


Chloride 


 


Carbon Dioxide 


 


Anion Gap 


 


BUN 


 


Creatinine 


 


Est GFR ( Amer) 


 


Est GFR (Non-Af Amer) 


 


POC Glucose (mg/dL)  131 H


 


Random Glucose 


 


Hemoglobin A1c 


 


Calcium 


 


Total Bilirubin 


 


AST 


 


ALT 


 


Alkaline Phosphatase 


 


NT-Pro-B Natriuret Pep 


 


Total Protein 


 


Albumin 


 


Globulin 


 


Albumin/Globulin Ratio

## 2018-08-02 NOTE — PN
Copied To:  Hoang Malagon MD

Attending MD:  Hoang Malagon MD



DATE:  08/02/2018



CARDIOLOGY FOLLOWUP



SUBJECTIVE:  The patient's edema in the lower extremities better.  His

breathing is better.



OBJECTIVE:

VITAL SIGNS:  On physical exam, the blood pressure is 171/102 compared to

140/66 this morning.

NECK:  Negative JVD.

LUNGS:  Without rales.

HEART:  Reveal S1, S2.

EXTREMITIES:  Decreasing edema with some chronic changes.



DATA:  Glucose is 132, creatinine is 2.1 with a potassium of 3.3.



IMPRESSION:

1.  Acute systolic congestive heart failure.

2.  Dilated cardiomyopathy.

3.  Chronic edema.

4.  Obesity.

5.  Hypertension.

6.  Hypercholesterolemia.

7.  Poor dietary indiscretion.

8.  Diabetes mellitus.



Given these findings, we will change his Lasix to p.o. Lasix.  We will

replace his potassium, which has been ordered.  We will discontinue

Telemetry today.  I have discussed with the patient about his need to

decrease his salt intake and to undergo cardiac risk reduction program.







__________________________________________

Hoang Malagon MD





DD:  08/02/2018 9:56:16

DT:  08/02/2018 9:59:58

Job # [id number]

## 2018-08-03 VITALS — HEART RATE: 64 BPM | SYSTOLIC BLOOD PRESSURE: 169 MMHG | DIASTOLIC BLOOD PRESSURE: 101 MMHG

## 2018-08-03 VITALS — OXYGEN SATURATION: 91 % | TEMPERATURE: 97 F

## 2018-08-03 LAB
ALBUMIN SERPL-MCNC: 3.1 G/DL (ref 3–4.8)
ALBUMIN/GLOB SERPL: 0.9 {RATIO} (ref 1.1–1.8)
ALT SERPL-CCNC: 24 U/L (ref 7–56)
AST SERPL-CCNC: 17 U/L (ref 17–59)
BUN SERPL-MCNC: 27 MG/DL (ref 7–21)
CALCIUM SERPL-MCNC: 8 MG/DL (ref 8.4–10.5)
ERYTHROCYTE [DISTWIDTH] IN BLOOD BY AUTOMATED COUNT: 16.2 % (ref 11.5–14.5)
GFR NON-AFRICAN AMERICAN: 34
HGB BLD-MCNC: 11.5 G/DL (ref 14–18)
MCH RBC QN AUTO: 22.2 PG (ref 25–35)
MCHC RBC AUTO-ENTMCNC: 29.3 G/DL (ref 31–37)
MCV RBC AUTO: 75.9 FL (ref 80–105)
PLATELET # BLD: 255 10^3/UL (ref 120–450)
PMV BLD AUTO: 9.3 FL (ref 7–11)
RBC # BLD AUTO: 5.18 10^6/UL (ref 3.5–6.1)
WBC # BLD AUTO: 10.5 10^3/UL (ref 4.5–11)

## 2018-08-03 RX ADMIN — POTASSIUM CHLORIDE SCH MEQ: 20 TABLET, EXTENDED RELEASE ORAL at 10:19

## 2018-08-03 RX ADMIN — INSULIN DETEMIR SCH UNITS: 100 INJECTION, SOLUTION SUBCUTANEOUS at 10:20

## 2018-08-03 RX ADMIN — INSULIN HUMAN SCH: 100 INJECTION, SOLUTION PARENTERAL at 08:41

## 2018-08-03 NOTE — CP.PCM.CON
<Tricia Tejada - Last Filed: 08/03/18 09:57>





History of Present Illness





- History of Present Illness


History of Present Illness: 


GI Fellow PGY5 Consult Note


This is a 47yM with pmhx HTN, HLD, CHF,CKD presenting with complaints of 

abdominal pain and progressively worsening SOB and edema. Pt was found to be 

HTN urgency and POLY on CKD with severe abdominal edema and distention. Pt was 

sent down for paracentesis but not enough fluid for removal. No prior hx of 

alcohol abuse or liver disease. Abd US with fatty liver and gallbladder sludge, 

hepatitis profile and negative and LFTs wnl. No prior EGD or Colonoscopy. Pt 

reports abdominal edema alittle bit better. He does have problems with 

constipation and alot of straining, not on a bowel regimen. Also reports GERD. 


ROS: A 12pt ROS was negative except as above


PmHX: As stated in HPI


PsHX: Toe debridement 


SHX: Denies etoh, drugs, or tobacco


FHx: Neg for liver or colon cancer 








Past Patient History





- Infectious Disease


Hx of Infectious Diseases: None





- Tetanus Immunizations


Tetanus Immunization: Unknown





- Past Medical History & Family History


Past Medical History?: No





- Past Social History


Smoking Status: Former Smoker





- CARDIAC


Hx Cardiac Disorders: Yes (mi x 1 feb or march 2017)


Hx Congestive Heart Failure: Yes


Hx Hypercholesterolemia: Yes


Hx Peripheral Edema: Yes (b/l thighs down to b/l feet +2 pitting)


Other/Comment: circulation problems, chest pain, pedal edema, femoral angiogram 

1/18/18 with stent, swelling +2 edema to both hands and abd





- PULMONARY


Hx Respiratory Disorders: Yes


Hx Bronchitis: Yes


Hx Pneumonia:  (pt denies)





- NEUROLOGICAL


Hx Neurological Disorder: Yes


HX Cerebrovascular Accident: Yes ("after my heart attack" pt stated no residual)


Hx Dizziness: Yes (vertigo)


Other/Comment: no feeling in hands and feet, difficulty walking due to no 

feeling in feet





- HEENT


Hx HEENT Problems: Yes


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





- RENAL


Hx Chronic Kidney Disease: No





- ENDOCRINE/METABOLIC


Hx Endocrine Disorders: Yes


Hx Diabetes Mellitus Type 2: Yes





- HEMATOLOGICAL/ONCOLOGICAL


Hx Blood Disorders: Yes


Other/Comment: mrsa left foot 2/19/18, mrsa left foot, left heel wound 1/16/18, 

mrsa left eye 10/13/15





- INTEGUMENTARY


Hx Dermatological Problems: Yes


Other/Comment: periorbital cellulitis healed, 5th toe left foot amputated 2/19/ 18 dry crustation over site, multiple lumps to ble from weeping edema that is 

now dry, tight skin both legs feet to thighs, leathery red and brown skin ble, 

dry thick crusty nails both feet, brown dry skin around toes, deep red burn to 

right thumb, +mrsa left foot heel wound healed





- MUSCULOSKELETAL/RHEUMATOLOGICAL


Hx Falls: No





- GASTROINTESTINAL


Hx Gastrointestinal Disorders: Yes (obese)


Other/Comment: abd firm and distended





- GENITOURINARY/GYNECOLOGICAL


Hx Genitourinary Disorders: No





- PSYCHIATRIC


Hx Substance Use: No





- SURGICAL HISTORY


Hx Surgeries: Yes (CARD STENTS X 3, FEMORAL ANGIO 1/19/18 WITH STENT)


Hx Coronary Stent: Yes (X3)


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





- ANESTHESIA


Hx Anesthesia Reactions: No


Hx Malignant Hyperthermia: No





Meds


Allergies/Adverse Reactions: 


 Allergies











Allergy/AdvReac Type Severity Reaction Status Date / Time


 


No Known Allergies Allergy   Verified 08/01/18 01:48














- Medications


Medications: 


 Current Medications





Amlodipine Besylate (Norvasc)  2.5 mg PO DAILY [facility]


   Last Admin: 08/02/18 09:38 Dose:  2.5 mg


Aspirin (Aspirin Chewable)  81 mg PO DAILY [facility]


   Last Admin: 08/02/18 09:38 Dose:  81 mg


Atorvastatin Calcium (Lipitor)  40 mg PO DAILY [facility]


   Last Admin: 08/02/18 09:38 Dose:  40 mg


Calcium Carbonate (Oscal)  500 mg PO DAILY [facility]


   Last Admin: 08/02/18 09:38 Dose:  500 mg


Carvedilol (Coreg)  25 mg PO BID [facility]


   Last Admin: 08/02/18 18:20 Dose:  25 mg


Clopidogrel Bisulfate (Plavix)  75 mg PO DAILY [facility]


   Last Admin: 08/02/18 09:38 Dose:  75 mg


Ferrous Sulfate (Feosol)  324 mg PO DAILY [facility]


Furosemide (Lasix)  80 mg PO BID [facility]


   Last Admin: 08/02/18 15:09 Dose:  80 mg


Insulin Detemir (Levemir)  40 unit SC DAILY [facility]


   Last Admin: 08/02/18 09:39 Dose:  40 units


Insulin Human Regular (Humulin R Med)  0 units SC [facility]


   PRN Reason: Protocol


   Last Admin: 08/03/18 08:41 Dose:  Not Given


Metolazone (Zaroxolyn)  2.5 mg PO DAILY [facility]


   Stop: 08/08/18 10:01


Metoprolol Tartrate (Lopressor)  50 mg PO BID [facility]


   Last Admin: 08/02/18 18:20 Dose:  50 mg


Potassium Chloride (K-Dur 20 Meq Er Tab)  40 meq PO BID [facility]


   Last Admin: 08/02/18 18:17 Dose:  40 meq











Physical Exam





- Constitutional


Appears: Non-toxic, No Acute Distress, Chronically Ill





- Head Exam


Head Exam: ATRAUMATIC, NORMAL INSPECTION, NORMOCEPHALIC





- Eye Exam


Eye Exam: EOMI, Normal appearance, PERRL


Pupil Exam: PERRL





- ENT Exam


ENT Exam: Mucous Membranes Moist





- Neck Exam


Neck exam: Positive for: Full Rom, Normal Inspection





- Respiratory Exam


Respiratory Exam: Rales, NORMAL BREATHING PATTERN





- Cardiovascular Exam


Cardiovascular Exam: REGULAR RHYTHM, RRR, +S1, +S2





- GI/Abdominal Exam


GI & Abdominal Exam: Distended, Firm, Normal Bowel Sounds, Rigid.  absent: 

Organomegaly, Soft, Tenderness





- Extremities Exam


Extremities exam: Positive for: pedal edema





- Neurological Exam


Neurological exam: Alert, Oriented x3





- Psychiatric Exam


Psychiatric exam: Normal Affect, Normal Mood





- Skin


Skin Exam: Dry, Intact, Normal Color, Warm





Results





- Vital Signs


Recent Vital Signs: 


 Last Vital Signs











Temp  97 F L  08/03/18 08:01


 


Pulse  60   08/03/18 08:01


 


Resp  20   08/03/18 08:01


 


BP  153/97 H  08/03/18 08:01


 


Pulse Ox  91 L  08/03/18 08:01














- Labs


Result Diagrams: 


 08/03/18 06:00





 08/03/18 06:00


Labs: 


 Laboratory Results - last 24 hr











  08/02/18 08/02/18 08/02/18





  06:30 11:37 16:13


 


WBC   


 


RBC   


 


Hgb   


 


Hct   


 


MCV   


 


MCH   


 


MCHC   


 


RDW   


 


Plt Count   


 


MPV   


 


Sodium   


 


Potassium   


 


Chloride   


 


Carbon Dioxide   


 


Anion Gap   


 


BUN   


 


Creatinine   


 


Est GFR ( Amer)   


 


Est GFR (Non-Af Amer)   


 


POC Glucose (mg/dL)   131 H  197 H


 


Random Glucose   


 


Hemoglobin A1c  12.1 H D  


 


Calcium   


 


Magnesium   


 


Iron   


 


TIBC   


 


% Saturation   


 


Ferritin   


 


Total Bilirubin   


 


AST   


 


ALT   


 


Alkaline Phosphatase   


 


Total Protein   


 


Albumin   


 


Globulin   


 


Albumin/Globulin Ratio   


 


TSH 3rd Generation   


 


Urine Color   


 


Urine Appearance   


 


Urine pH   


 


Ur Specific Gravity   


 


Urine Protein   


 


Urine Glucose (UA)   


 


Urine Ketones   


 


Urine Blood   


 


Urine Nitrate   


 


Urine Bilirubin   


 


Urine Urobilinogen   


 


Ur Leukocyte Esterase   


 


Urine RBC   


 


Urine WBC   


 


Ur Epithelial Cells   


 


Ur Random Creatinine   


 


Ur Random Sodium   


 


Complement C3   


 


Complement C4   


 


Hep Bs Antigen   


 


Hep Bs Antibody   


 


Hep B Core IgM Ab   


 


Hepatitis C Antibody   


 


HIV 1&2 Antibody Screen   














  08/02/18 08/02/18 08/02/18





  18:34 18:34 18:42


 


WBC   


 


RBC   


 


Hgb   


 


Hct   


 


MCV   


 


MCH   


 


MCHC   


 


RDW   


 


Plt Count   


 


MPV   


 


Sodium   


 


Potassium   


 


Chloride   


 


Carbon Dioxide   


 


Anion Gap   


 


BUN   


 


Creatinine   


 


Est GFR ( Amer)   


 


Est GFR (Non-Af Amer)   


 


POC Glucose (mg/dL)   


 


Random Glucose   


 


Hemoglobin A1c   


 


Calcium   


 


Magnesium   


 


Iron   


 


TIBC   


 


% Saturation   


 


Ferritin   


 


Total Bilirubin   


 


AST   


 


ALT   


 


Alkaline Phosphatase   


 


Total Protein   


 


Albumin   


 


Globulin   


 


Albumin/Globulin Ratio   


 


TSH 3rd Generation   


 


Urine Color  Yellow  


 


Urine Appearance  Clear  


 


Urine pH  6.0  


 


Ur Specific Gravity  >= 1.030  


 


Urine Protein  >=300 H  


 


Urine Glucose (UA)  250 H  


 


Urine Ketones  Negative  


 


Urine Blood  Moderate H  


 


Urine Nitrate  Negative  


 


Urine Bilirubin  Negative  


 


Urine Urobilinogen  0.2  


 


Ur Leukocyte Esterase  Negative  


 


Urine RBC  20 - 25  


 


Urine WBC  2 - 5  


 


Ur Epithelial Cells  6 - 8  


 


Ur Random Creatinine   127  142


 


Ur Random Sodium   36 


 


Complement C3   


 


Complement C4   


 


Hep Bs Antigen   


 


Hep Bs Antibody   


 


Hep B Core IgM Ab   


 


Hepatitis C Antibody   


 


HIV 1&2 Antibody Screen   














  08/02/18 08/02/18 08/02/18





  19:28 19:28 19:28


 


WBC   


 


RBC   


 


Hgb   


 


Hct   


 


MCV   


 


MCH   


 


MCHC   


 


RDW   


 


Plt Count   


 


MPV   


 


Sodium   


 


Potassium   


 


Chloride   


 


Carbon Dioxide   


 


Anion Gap   


 


BUN   


 


Creatinine   


 


Est GFR ( Amer)   


 


Est GFR (Non-Af Amer)   


 


POC Glucose (mg/dL)   


 


Random Glucose   


 


Hemoglobin A1c   


 


Calcium   


 


Magnesium   


 


Iron    29 L


 


TIBC    298


 


% Saturation    10 L


 


Ferritin  32.5  


 


Total Bilirubin   


 


AST   


 


ALT   


 


Alkaline Phosphatase   


 


Total Protein   


 


Albumin   


 


Globulin   


 


Albumin/Globulin Ratio   


 


TSH 3rd Generation   


 


Urine Color   


 


Urine Appearance   


 


Urine pH   


 


Ur Specific Gravity   


 


Urine Protein   


 


Urine Glucose (UA)   


 


Urine Ketones   


 


Urine Blood   


 


Urine Nitrate   


 


Urine Bilirubin   


 


Urine Urobilinogen   


 


Ur Leukocyte Esterase   


 


Urine RBC   


 


Urine WBC   


 


Ur Epithelial Cells   


 


Ur Random Creatinine   


 


Ur Random Sodium   


 


Complement C3   


 


Complement C4   


 


Hep Bs Antigen  Negative  


 


Hep Bs Antibody   Negative 


 


Hep B Core IgM Ab  Negative  


 


Hepatitis C Antibody   


 


HIV 1&2 Antibody Screen    Negative














  08/02/18 08/02/18 08/02/18





  19:28 19:28 21:34


 


WBC   


 


RBC   


 


Hgb   


 


Hct   


 


MCV   


 


MCH   


 


MCHC   


 


RDW   


 


Plt Count   


 


MPV   


 


Sodium   


 


Potassium   


 


Chloride   


 


Carbon Dioxide   


 


Anion Gap   


 


BUN   


 


Creatinine   


 


Est GFR ( Amer)   


 


Est GFR (Non-Af Amer)   


 


POC Glucose (mg/dL)    154 H


 


Random Glucose   


 


Hemoglobin A1c   


 


Calcium   


 


Magnesium   


 


Iron   


 


TIBC   


 


% Saturation   


 


Ferritin   


 


Total Bilirubin   


 


AST   


 


ALT   


 


Alkaline Phosphatase   


 


Total Protein   


 


Albumin   


 


Globulin   


 


Albumin/Globulin Ratio   


 


TSH 3rd Generation   


 


Urine Color   


 


Urine Appearance   


 


Urine pH   


 


Ur Specific Gravity   


 


Urine Protein   


 


Urine Glucose (UA)   


 


Urine Ketones   


 


Urine Blood   


 


Urine Nitrate   


 


Urine Bilirubin   


 


Urine Urobilinogen   


 


Ur Leukocyte Esterase   


 


Urine RBC   


 


Urine WBC   


 


Ur Epithelial Cells   


 


Ur Random Creatinine   


 


Ur Random Sodium   


 


Complement C3   108.0 


 


Complement C4   19.2 


 


Hep Bs Antigen   


 


Hep Bs Antibody   


 


Hep B Core IgM Ab   


 


Hepatitis C Antibody  Negative  


 


HIV 1&2 Antibody Screen   














  08/03/18 08/03/18 08/03/18





  06:00 06:00 06:00


 


WBC  10.5  


 


RBC  5.18  


 


Hgb  11.5 L  


 


Hct  39.3 L  


 


MCV  75.9 L  


 


MCH  22.2 L  


 


MCHC  29.3 L  


 


RDW  16.2 H  


 


Plt Count  255  


 


MPV  9.3  


 


Sodium   142 


 


Potassium   3.6 


 


Chloride   100 


 


Carbon Dioxide   33 


 


Anion Gap   12 


 


BUN   27 H 


 


Creatinine   2.1 H 


 


Est GFR ( Amer)   41 


 


Est GFR (Non-Af Amer)   34 


 


POC Glucose (mg/dL)   


 


Random Glucose   183 H 


 


Hemoglobin A1c   


 


Calcium   8.0 L 


 


Magnesium   2.0 


 


Iron   


 


TIBC   


 


% Saturation   


 


Ferritin   


 


Total Bilirubin   0.5 


 


AST   17  D 


 


ALT   24 


 


Alkaline Phosphatase   79 


 


Total Protein   6.6 


 


Albumin   3.1 


 


Globulin   3.5 


 


Albumin/Globulin Ratio   0.9 L 


 


TSH 3rd Generation    3.77


 


Urine Color   


 


Urine Appearance   


 


Urine pH   


 


Ur Specific Gravity   


 


Urine Protein   


 


Urine Glucose (UA)   


 


Urine Ketones   


 


Urine Blood   


 


Urine Nitrate   


 


Urine Bilirubin   


 


Urine Urobilinogen   


 


Ur Leukocyte Esterase   


 


Urine RBC   


 


Urine WBC   


 


Ur Epithelial Cells   


 


Ur Random Creatinine   


 


Ur Random Sodium   


 


Complement C3   


 


Complement C4   


 


Hep Bs Antigen   


 


Hep Bs Antibody   


 


Hep B Core IgM Ab   


 


Hepatitis C Antibody   


 


HIV 1&2 Antibody Screen   














Assessment & Plan





- Assessment and Plan (Free Text)


Assessment: 


This is a 47yM presenting with SOB.


1. Abdominal edema


2. AECHF


3. POLY on CKD


4. GERD


5. Constipation 











Plan: 


-Continue supportive care


-No signs of ascites, Abd US with no cirrhosis of liver, no lab findings 

consistent with this liver disease


-Edema likely from CHF


-Optimize cardiac function and renal function with diuretic therapy


-Hepatitis serologies negative, LFTs wnl


-Bowel regimen with miralax daily/bid for constipation 


-Pepcid bid for GERD 


-Pt will need outpt EGD/Colonoscopy once medically optimized 











<Ras Vazquez - Last Filed: 08/03/18 12:32>





Results





- Vital Signs


Recent Vital Signs: 


 Last Vital Signs











Temp  97 F L  08/03/18 08:01


 


Pulse  64   08/03/18 10:21


 


Resp  20   08/03/18 08:01


 


BP  169/101 H  08/03/18 10:21


 


Pulse Ox  91 L  08/03/18 08:01














- Labs


Result Diagrams: 


 08/03/18 06:00





 08/03/18 06:00


Labs: 


 Laboratory Results - last 24 hr











  08/02/18 08/02/18 08/02/18





  16:13 18:34 18:34


 


WBC   


 


RBC   


 


Hgb   


 


Hct   


 


MCV   


 


MCH   


 


MCHC   


 


RDW   


 


Plt Count   


 


MPV   


 


Sodium   


 


Potassium   


 


Chloride   


 


Carbon Dioxide   


 


Anion Gap   


 


BUN   


 


Creatinine   


 


Est GFR ( Amer)   


 


Est GFR (Non-Af Amer)   


 


POC Glucose (mg/dL)  197 H  


 


Random Glucose   


 


Calcium   


 


Magnesium   


 


Iron   


 


TIBC   


 


% Saturation   


 


Ferritin   


 


Total Bilirubin   


 


AST   


 


ALT   


 


Alkaline Phosphatase   


 


Total Protein   


 


Albumin   


 


Globulin   


 


Albumin/Globulin Ratio   


 


25-OH Vitamin D Total   


 


TSH 3rd Generation   


 


Urine Color    Yellow


 


Urine Appearance    Clear


 


Urine pH    6.0


 


Ur Specific Gravity    >= 1.030


 


Urine Protein    >=300 H


 


Urine Glucose (UA)    250 H


 


Urine Ketones    Negative


 


Urine Blood    Moderate H


 


Urine Nitrate    Negative


 


Urine Bilirubin    Negative


 


Urine Urobilinogen    0.2


 


Ur Leukocyte Esterase    Negative


 


Urine RBC    20 - 25


 


Urine WBC    2 - 5


 


Ur Epithelial Cells    6 - 8


 


Ur Random Creatinine   


 


U Random Total Protein   15273 H 


 


Ur Random Sodium   


 


Urine Total Volume   


 


Microalb/Creat Ratio   


 


Complement C3   


 


Complement C4   


 


Hep Bs Antigen   


 


Hep Bs Antibody   


 


Hep B Core IgM Ab   


 


Hepatitis C Antibody   


 


HIV 1&2 Antibody Screen   














  08/02/18 08/02/18 08/02/18





  18:34 18:42 19:28


 


WBC   


 


RBC   


 


Hgb   


 


Hct   


 


MCV   


 


MCH   


 


MCHC   


 


RDW   


 


Plt Count   


 


MPV   


 


Sodium   


 


Potassium   


 


Chloride   


 


Carbon Dioxide   


 


Anion Gap   


 


BUN   


 


Creatinine   


 


Est GFR ( Amer)   


 


Est GFR (Non-Af Amer)   


 


POC Glucose (mg/dL)   


 


Random Glucose   


 


Calcium   


 


Magnesium   


 


Iron   


 


TIBC   


 


% Saturation   


 


Ferritin    32.5


 


Total Bilirubin   


 


AST   


 


ALT   


 


Alkaline Phosphatase   


 


Total Protein   


 


Albumin   


 


Globulin   


 


Albumin/Globulin Ratio   


 


25-OH Vitamin D Total   


 


TSH 3rd Generation   


 


Urine Color   


 


Urine Appearance   


 


Urine pH   


 


Ur Specific Gravity   


 


Urine Protein   


 


Urine Glucose (UA)   


 


Urine Ketones   


 


Urine Blood   


 


Urine Nitrate   


 


Urine Bilirubin   


 


Urine Urobilinogen   


 


Ur Leukocyte Esterase   


 


Urine RBC   


 


Urine WBC   


 


Ur Epithelial Cells   


 


Ur Random Creatinine  127  142 


 


U Random Total Protein   


 


Ur Random Sodium  36  


 


Urine Total Volume   >600.0 


 


Microalb/Creat Ratio   Note 


 


Complement C3   


 


Complement C4   


 


Hep Bs Antigen    Negative


 


Hep Bs Antibody   


 


Hep B Core IgM Ab    Negative


 


Hepatitis C Antibody   


 


HIV 1&2 Antibody Screen   














  08/02/18 08/02/18 08/02/18





  19:28 19:28 19:28


 


WBC   


 


RBC   


 


Hgb   


 


Hct   


 


MCV   


 


MCH   


 


MCHC   


 


RDW   


 


Plt Count   


 


MPV   


 


Sodium   


 


Potassium   


 


Chloride   


 


Carbon Dioxide   


 


Anion Gap   


 


BUN   


 


Creatinine   


 


Est GFR ( Amer)   


 


Est GFR (Non-Af Amer)   


 


POC Glucose (mg/dL)   


 


Random Glucose   


 


Calcium   


 


Magnesium   


 


Iron   29 L 


 


TIBC   298 


 


% Saturation   10 L 


 


Ferritin   


 


Total Bilirubin   


 


AST   


 


ALT   


 


Alkaline Phosphatase   


 


Total Protein   


 


Albumin   


 


Globulin   


 


Albumin/Globulin Ratio   


 


25-OH Vitamin D Total   


 


TSH 3rd Generation   


 


Urine Color   


 


Urine Appearance   


 


Urine pH   


 


Ur Specific Gravity   


 


Urine Protein   


 


Urine Glucose (UA)   


 


Urine Ketones   


 


Urine Blood   


 


Urine Nitrate   


 


Urine Bilirubin   


 


Urine Urobilinogen   


 


Ur Leukocyte Esterase   


 


Urine RBC   


 


Urine WBC   


 


Ur Epithelial Cells   


 


Ur Random Creatinine   


 


U Random Total Protein   


 


Ur Random Sodium   


 


Urine Total Volume   


 


Microalb/Creat Ratio   


 


Complement C3   


 


Complement C4   


 


Hep Bs Antigen   


 


Hep Bs Antibody  Negative  


 


Hep B Core IgM Ab   


 


Hepatitis C Antibody    Negative


 


HIV 1&2 Antibody Screen   Negative 














  08/02/18 08/02/18 08/03/18





  19:28 21:34 06:00


 


WBC    10.5


 


RBC    5.18


 


Hgb    11.5 L


 


Hct    39.3 L


 


MCV    75.9 L


 


MCH    22.2 L


 


MCHC    29.3 L


 


RDW    16.2 H


 


Plt Count    255


 


MPV    9.3


 


Sodium   


 


Potassium   


 


Chloride   


 


Carbon Dioxide   


 


Anion Gap   


 


BUN   


 


Creatinine   


 


Est GFR ( Amer)   


 


Est GFR (Non-Af Amer)   


 


POC Glucose (mg/dL)   154 H 


 


Random Glucose   


 


Calcium   


 


Magnesium   


 


Iron   


 


TIBC   


 


% Saturation   


 


Ferritin   


 


Total Bilirubin   


 


AST   


 


ALT   


 


Alkaline Phosphatase   


 


Total Protein   


 


Albumin   


 


Globulin   


 


Albumin/Globulin Ratio   


 


25-OH Vitamin D Total   


 


TSH 3rd Generation   


 


Urine Color   


 


Urine Appearance   


 


Urine pH   


 


Ur Specific Gravity   


 


Urine Protein   


 


Urine Glucose (UA)   


 


Urine Ketones   


 


Urine Blood   


 


Urine Nitrate   


 


Urine Bilirubin   


 


Urine Urobilinogen   


 


Ur Leukocyte Esterase   


 


Urine RBC   


 


Urine WBC   


 


Ur Epithelial Cells   


 


Ur Random Creatinine   


 


U Random Total Protein   


 


Ur Random Sodium   


 


Urine Total Volume   


 


Microalb/Creat Ratio   


 


Complement C3  108.0  


 


Complement C4  19.2  


 


Hep Bs Antigen   


 


Hep Bs Antibody   


 


Hep B Core IgM Ab   


 


Hepatitis C Antibody   


 


HIV 1&2 Antibody Screen   














  08/03/18 08/03/18 08/03/18





  06:00 06:00 06:00


 


WBC   


 


RBC   


 


Hgb   


 


Hct   


 


MCV   


 


MCH   


 


MCHC   


 


RDW   


 


Plt Count   


 


MPV   


 


Sodium  142  


 


Potassium  3.6  


 


Chloride  100  


 


Carbon Dioxide  33  


 


Anion Gap  12  


 


BUN  27 H  


 


Creatinine  2.1 H  


 


Est GFR ( Amer)  41  


 


Est GFR (Non-Af Amer)  34  


 


POC Glucose (mg/dL)   


 


Random Glucose  183 H  


 


Calcium  8.0 L  


 


Magnesium  2.0  


 


Iron   


 


TIBC   


 


% Saturation   


 


Ferritin   


 


Total Bilirubin  0.5  


 


AST  17  D  


 


ALT  24  


 


Alkaline Phosphatase  79  


 


Total Protein  6.6  


 


Albumin  3.1  


 


Globulin  3.5  


 


Albumin/Globulin Ratio  0.9 L  


 


25-OH Vitamin D Total   < 12.8 L 


 


TSH 3rd Generation    3.77


 


Urine Color   


 


Urine Appearance   


 


Urine pH   


 


Ur Specific Gravity   


 


Urine Protein   


 


Urine Glucose (UA)   


 


Urine Ketones   


 


Urine Blood   


 


Urine Nitrate   


 


Urine Bilirubin   


 


Urine Urobilinogen   


 


Ur Leukocyte Esterase   


 


Urine RBC   


 


Urine WBC   


 


Ur Epithelial Cells   


 


Ur Random Creatinine   


 


U Random Total Protein   


 


Ur Random Sodium   


 


Urine Total Volume   


 


Microalb/Creat Ratio   


 


Complement C3   


 


Complement C4   


 


Hep Bs Antigen   


 


Hep Bs Antibody   


 


Hep B Core IgM Ab   


 


Hepatitis C Antibody   


 


HIV 1&2 Antibody Screen   














  08/03/18





  07:25


 


WBC 


 


RBC 


 


Hgb 


 


Hct 


 


MCV 


 


MCH 


 


MCHC 


 


RDW 


 


Plt Count 


 


MPV 


 


Sodium 


 


Potassium 


 


Chloride 


 


Carbon Dioxide 


 


Anion Gap 


 


BUN 


 


Creatinine 


 


Est GFR ( Amer) 


 


Est GFR (Non-Af Amer) 


 


POC Glucose (mg/dL)  191 H


 


Random Glucose 


 


Calcium 


 


Magnesium 


 


Iron 


 


TIBC 


 


% Saturation 


 


Ferritin 


 


Total Bilirubin 


 


AST 


 


ALT 


 


Alkaline Phosphatase 


 


Total Protein 


 


Albumin 


 


Globulin 


 


Albumin/Globulin Ratio 


 


25-OH Vitamin D Total 


 


TSH 3rd Generation 


 


Urine Color 


 


Urine Appearance 


 


Urine pH 


 


Ur Specific Gravity 


 


Urine Protein 


 


Urine Glucose (UA) 


 


Urine Ketones 


 


Urine Blood 


 


Urine Nitrate 


 


Urine Bilirubin 


 


Urine Urobilinogen 


 


Ur Leukocyte Esterase 


 


Urine RBC 


 


Urine WBC 


 


Ur Epithelial Cells 


 


Ur Random Creatinine 


 


U Random Total Protein 


 


Ur Random Sodium 


 


Urine Total Volume 


 


Microalb/Creat Ratio 


 


Complement C3 


 


Complement C4 


 


Hep Bs Antigen 


 


Hep Bs Antibody 


 


Hep B Core IgM Ab 


 


Hepatitis C Antibody 


 


HIV 1&2 Antibody Screen 














Attending/Attestation





- Attestation


I have personally seen and examined this patient.: Yes


I have fully participated in the care of the patient.: Yes


I have reviewed all pertinent clinical information: Yes


Notes (Text): 





08/03/18 12:28


I have seen and examined patient with GI fellow.  Agree with above 

documentation with the following additions.  In brief, this is a 47 year old 

male with history of obesity (BMI 48), decompensated CHF, HTN, CKD who presents 

to hospital with complaint of progressive dyspnea, abdominal distention, and 

lower extremity edema.  He complains of abdominal "tight" sensation but denies 

nausea, vomiting, diarrhea, fever/chills, weight loss, or rectal bleeding.  He 

denies excessive ETOH consumption.  No prior endoscopic evaluation.





Obesity


Decompensated CHF


HTN


CKD





- Low sodium diet as tolerated


- Follow up cardiology recommendations regarding CHF management


- Suggest bowel regimen to prevent constipation


- Abdominal US reviewed showing fatty liver, otherwise no features of cirrhosis

, LFTs normal


- Patient planned for hospital discharge today, would benefit from outpatient 

GI follow up after resolution of acute cardiac issues.  Discussed with Dr. Castañeda.

## 2018-08-03 NOTE — DS
Copied To:  Jair Castañeda DO

Attending MD:  Jair Castañeda DO



HISTORY OF PRESENT ILLNESS:  I was hoping to discharge him later yesterday,

but GI never saw him.  He was recommended to have a gastroenterologist seen

before he leaves the hospital.  He is on aspirin, Coreg, insulin,

potassium, Lasix, Levemir, Lipitor, Lopressor, Norvasc, Os-Wero, Plavix, and

Zaroxolyn.



PHYSICAL EXAMINATION:

GENERAL:  He is sitting up in bed, feeling well.

VITAL SIGNS:  He has a 98.3 temperature, 73 pulse, 130/70 blood pressure,

20 respiratory rate, 95% O2 sat on 2 L.

HEENT:  His head is atraumatic and normocephalic.

HEART:  Regular rate.

LUNGS:  Decreased breath sounds, but clear.

ABDOMEN:  Soft, morbidly obese.

EXTREMITIES:  A +2/4 pitting edema, better.



LABORATORY DATA:  He has a white count of 7.5, hemoglobin 11.5, hematocrit

39.3, and platelets of 255.  He has a 142 sodium, potassium 3.6.  BUN 27,

creatinine 2.1.  GFR is 34.  Sugar is 183.  Calcium is 8, magnesium 2. 

Iron is 29.  Total bilirubin is 0.5, AST is 17, ALT is 24, and alkaline

phosphatase 79.



He will be discharged today.  His  normal.  Serology was all negative.

He was seen by [facility]al and Cardiology.  Waiting for GI to see him.  He will

be on aspirin, Coreg, potassium, Lasix 80 twice a day, Levemir, Lipitor,

Lopressor, Norvasc, Os-Wero, Plavix, Zaroxolyn, and iron.  We will continue

with aggressive treatment and care in the outpatient.  He should see me in

a week and he should be discharged hopefully after GI sees him.  He came in

here with acute congestive heart failure.







__________________________________________

Jair Castañeda DO



DD:  08/03/2018 7:53:45

DT:  08/03/2018 13:56:58

Job # [id number]

MTDD

## 2018-08-03 NOTE — PN
Copied To:  Hoang Malagon MD

Attending MD:  Hoang Malagon MD



DATE:  08/03/2018



CARDIOLOGY FOLLOWUP



SUBJECTIVE:   The patient's breathing is better.  His edema is better.



PHYSICAL EXAMINATION

VITAL SIGNS:  Blood pressure varies from 153 to 169 systolic, heart rate in

the 60s.

NECK:  Negative JVD.

LUNGS:  Without rales.

HEART:  S1, S2.

EXTREMITIES:  Chronic edema.



LABORATORY DATA:  Hemoglobin is 11.5.  Chemistries, BUN and creatinine 27

and 2.1.



IMPRESSION:

1.  Congestive heart failure, which is improved.

2.  Chronic pedal edema.

3.  Obesity.

4.  Renal insufficiency.

5.  Diabetes mellitus.

6.  Hypercholesterolemia.

7.  Hypertension.

8.  Dilated cardiomyopathy.



PLAN:  Given these findings, I had an extensive discussion with the patient

about his need for changing his diet, reducing weight and continue taking

his medications.  The patient seems to understand and is agreeable.  The

patient is discharged today.  Followup and instructions have been given to

the patient in detail.





__________________________________________

Hoang Malagon MD





DD:  08/03/2018 10:44:56

DT:  08/03/2018 10:50:40

Job # [id number]

## 2018-08-03 NOTE — CP.PCM.PN
Subjective





- Date & Time of Evaluation


Date of Evaluation: 08/03/18


Time of Evaluation: 07:30





- Subjective


Subjective: 


GI Fellow PGY5 Consult Note


This is a 47yM with pmhx HTN, HLD, CHF,CKD presenting with complaints of 

abdominal pain and progressively worsening SOB and edema. Pt was found to be 

HTN urgency and POLY on CKD with severe abdominal edema and distention. Pt was 

sent down for paracentesis but not enough fluid for removal. No prior hx of 

alcohol abuse or liver disease. Abd US with fatty liver and gallbladder sludge, 

hepatitis profile and negative and LFTs wnl. No prior EGD or Colonoscopy. Pt 

reports abdominal edema alittle bit better. He does have problems with 

constipation and alot of straining, not on a bowel regimen. Also reports GERD. 


ROS: A 12pt ROS was negative except as above


PmHX: As stated in HPI


PsHX: Toe debridement 


SHX: Denies etoh, drugs, or tobacco


FHx: Neg for liver or colon cancer 








Objective





- Vital Signs/Intake and Output


Vital Signs (last 24 hours): 


 











Temp Pulse Resp BP Pulse Ox


 


 97 F L  60   20   153/97 H  91 L


 


 08/03/18 08:01  08/03/18 08:01  08/03/18 08:01  08/03/18 08:01  08/03/18 08:01











- Medications


Medications: 


 Current Medications





Amlodipine Besylate (Norvasc)  2.5 mg PO DAILY [facility]


   Last Admin: 08/02/18 09:38 Dose:  2.5 mg


Aspirin (Aspirin Chewable)  81 mg PO DAILY [facility]


   Last Admin: 08/02/18 09:38 Dose:  81 mg


Atorvastatin Calcium (Lipitor)  40 mg PO DAILY [facility]


   Last Admin: 08/02/18 09:38 Dose:  40 mg


Calcium Carbonate (Oscal)  500 mg PO DAILY [facility]


   Last Admin: 08/02/18 09:38 Dose:  500 mg


Carvedilol (Coreg)  25 mg PO BID [facility]


   Last Admin: 08/02/18 18:20 Dose:  25 mg


Clopidogrel Bisulfate (Plavix)  75 mg PO DAILY [facility]


   Last Admin: 08/02/18 09:38 Dose:  75 mg


Ferrous Sulfate (Feosol)  324 mg PO DAILY [facility]


Furosemide (Lasix)  80 mg PO BID [facility]


   Last Admin: 08/02/18 15:09 Dose:  80 mg


Insulin Detemir (Levemir)  40 unit SC DAILY [facility]


   Last Admin: 08/02/18 09:39 Dose:  40 units


Insulin Human Regular (Humulin R Med)  0 units SC ACHS [facility]


   PRN Reason: Protocol


   Last Admin: 08/02/18 21:58 Dose:  Not Given


Metolazone (Zaroxolyn)  2.5 mg PO DAILY [facility]


   Stop: 08/08/18 10:01


Metoprolol Tartrate (Lopressor)  50 mg PO BID [facility]


   Last Admin: 08/02/18 18:20 Dose:  50 mg


Potassium Chloride (K-Dur 20 Meq Er Tab)  40 meq PO BID [facility]


   Last Admin: 08/02/18 18:17 Dose:  40 meq











- Labs


Labs: 


 





 08/03/18 06:00 





 08/03/18 06:00 





 











PT  12.4 SECONDS (9.4-12.5)   08/01/18  02:13    


 


INR  1.09   08/01/18  02:13    


 


APTT  33.0 Seconds (25.1-36.5)   08/01/18  02:13    














- Constitutional


Appears: Non-toxic, No Acute Distress, Chronically Ill





- Head Exam


Head Exam: ATRAUMATIC, NORMAL INSPECTION, NORMOCEPHALIC





- Eye Exam


Eye Exam: EOMI, Normal appearance, PERRL


Pupil Exam: PERRL





- ENT Exam


ENT Exam: Mucous Membranes Moist





- Neck Exam


Neck Exam: Full ROM, Normal Inspection





- Respiratory Exam


Respiratory Exam: Rales, NORMAL BREATHING PATTERN.  absent: Respiratory Distress





- Cardiovascular Exam


Cardiovascular Exam: REGULAR RHYTHM, RRR, +S1, +S2





- GI/Abdominal Exam


GI & Abdominal Exam: Distended, Firm, Rigid, Normal Bowel Sounds.  absent: 

Tenderness, Organomegaly





- Rectal Exam


Rectal Exam: Deferred





- Extremities Exam


Extremities Exam: Full ROM, Pedal Edema





- Neurological Exam


Neurological Exam: Alert, Awake, Oriented x3





- Psychiatric Exam


Psychiatric exam: Normal Affect, Normal Mood





- Skin


Skin Exam: Dry, Intact, Normal Color, Warm





Assessment and Plan





- Assessment and Plan (Free Text)


Assessment: 


This is a 47yM presenting with SOB.


1. Abdominal edema


2. AECHF


3. POLY on CKD


4. GERD


5. Constipation 





Plan: 


-Continue supportive care


-No signs of ascites, Abd US with no cirrhosis of liver, no lab findings 

consistent with this liver disease


-Edema likely from CHF


-Optimize cardiac function and renal function with diuretic therapy


-Hepatitis serologies negative, LFTs wnl


-Bowel regimen with miralax daily/bid for constipation 


-Pepcid bid for GERD 


-Pt will need outpt EGD/Colonoscopy once medically optimized

## 2018-08-03 NOTE — CP.PCM.PN
Subjective





- Date & Time of Evaluation


Date of Evaluation: 08/03/18


Time of Evaluation: 11:00





- Subjective


Subjective: 





Nephrology Consultation Note:





Assessment: stable


sys CHF exacerbation with fluid overload


uncontrolled severe HTN with urgency, hypokalemia


POLY ? cardiorenal/abdomen compartment syndrome, hemodynamic due to BP 

fluctuations


CKD stage 3 with ? proteinuria ? Due to DM/HTN


Diabetic (E11.22) and HTN kidney disease (I12.9)


morbid Obesity


Anemia


possible obesity hypovent syndrome with chronic Co2 retention


ex smoker


Vit D def





Plan


no acute need for renal replacement therapy at this time


Hypertension control with meds as ordered. pt not on RAAS blocker, will add 

once volume status better optimized


Monitor I/O, daily weights and renal function while in hospital


oral fluid restriction. increase lasix to 80 mg bid and add metolazone 2.5 mg/

day. oral fluid restriction to 1000 ml/day


supplement KDUR


metformin preferably be d/c since serum cr 2.1 and hx of CHF. please consider 

alternative agent for glycemic control


CHF optimization as per cardiology





work up for kidney disease and secondary HTN as ordered





Dose meds/antibiotics for reduced GFR. Avoid fleets enema/magnesium based 

laxatives. Avoid nephrotoxins/NSAIDs/ iodinated contrast (unless needed 

emergently)


Glycemic control, renal diet. pt need to loose weight, follow diet control/

exercise and lifestyle modifications


Further work up for as per primary team.


pt advised to f/up in 1 week after d/c





Thanks for allowing me to participate in care of your patient. Will follow with 

you. Please call if any Qs. had d/w team


Dr Markel Rojas


Office: [phone] Fax: [phone]





CC; legs swelling and SOB


reason for consult: CKD


HPI: Pt is a 46 y/o M with hx of diabetes Mellitus ( >30 years), HTN (years), 

CKD stage 3 (with cr 1.7-1.8), morbid obesity, possible obesity hypovent 

syndrome with chronic Co2 retention, chronic sys CHF, ex smoker presented to 

hospital with complaints of SOB and worsening leg swelling, being managed for 

CHF exacerbation and renal consult for CKD management. pt feels better now


Denies chest pain, palpitation, improved shortness of breath, reports chronic 

leg swelling but better now


Denies OTC/herbal meds/NSAIDs


No recent iodinated contrast exposure. 





ROS: denies CP/nausea/vomitting. improved SOB. no nausea denies pain abdomen. 

denies urine complaints 


rest other negative except as mentioned in HPI. 





Physical Examination:   


General Appearance: Comfortable, in no acute respiratory distress, co-

operative. morbidly obese


Vitals reviewed and noted as below


Head; Atraumatic, normocephalic


ENT: no ulcers no thrush. Tongue is midline. Oropharynx: no rash or ulcers.


EYES: b/l PERRLA


Neck; supple no lymphadenopathy, no thyromegaly or bruit


Lungs: Normal respiratory rate/effort. Breath sounds decreased at bases


Heart: Normal rate. s1s2 normal. No rub or gallop. 


Extremities: 2-3+ edema. No varicose veins. chronic venous stasis changes in 

leg noted


Neurological: Patient is awake alert and follow commands no focal deficit


Skin: dry and warm. Normal turgor. No rash. Palpitation: Normal elasticity for 

age


Abdomen: Abdomen is distended and appears tense. Bowel sounds +. There is no 

abdominal tenderness, no guarding/rigidity or organomegaly but limited exam. 

has abdomen wall edema


Psych: normal insight. normal affect/mood


MSK: no specific joint tenderness or swelling. Digits and nails normal, no 

deformity


: kidney  not palpable. bladder not distended 





Labs/imaging/EKG reviewed.


Past medical history, past surgical history, social history, allergy reviewed 

and noted as below


Family hx; no hx of CKD. non contributory





abdomen sonogram shows normal kidneys





Objective





- Vital Signs/Intake and Output


Vital Signs (last 24 hours): 


 











Temp Pulse Resp BP Pulse Ox


 


 97 F L  64   20   169/101 H  91 L


 


 08/03/18 08:01  08/03/18 10:21  08/03/18 08:01  08/03/18 10:21  08/03/18 08:01











- Labs


Labs: 


 





 08/03/18 06:00 





 08/03/18 06:00 





 











PT  12.4 SECONDS (9.4-12.5)   08/01/18  02:13    


 


INR  1.09   08/01/18  02:13    


 


APTT  33.0 Seconds (25.1-36.5)   08/01/18  02:13

## 2018-08-06 LAB — ALDOST/RENIN PLAS-RTO: (no result) RATIO (ref 0.9–28.9)

## 2019-09-09 NOTE — PN
DATE:



ENDOCRINOLOGY FOLLOWUP NOTE



LOCATION:  Room 572.



SUBJECTIVE:  This is a 47-year-old male with recent uncontrolled type 2

insulin requiring diabetes, presenting here with left heel neuropathic

ulceration and associated cellulitis, and is now being followed closely for

metabolic management because of recent hyperglycemic acceleration as

noticed.  His glucose values are fluctuating, but improved and the glucose

levels today are ranging from 147 to 220 and 294 mg/dL.  The latest

chemistry showed BUN of 17, sodium 136, potassium 2.9, chloride 102, CO2 of

29, glucose 204, and creatinine 2.2.  So at this time, we will modify once

again his basal and bolus insulin regimen and increase the Humalog to 14

units subcu t.i.d. before meals to start today.  We will also increase and

titrate his basal insulin with Levemir to be increased to 36 units subcu at

bedside daily prescribed tonight.  We will modify the coverage scale to

obviate hypoglycemia and detailed orders have been given with very low dose

Humalog insulin coverage as ordered.  We will obtain serial chemistries

with supplement accordingly as needed.  We will also initiate diabetic

education and dietary instructions at the time of his admission to include

nutritional counseling and weight loss reduction efforts as noted with

healthier food choices.  We will follow.





__________________________________________

Leti Vincent MD





DD:  01/17/2018 23:17:53

DT:  01/18/2018 0:11:15

Job # [id number] The vaccine orders were put for within [facility] however I just sent the oral vaccine request to her pharmacy

## 2023-11-21 NOTE — CP.PCM.CON
<Kasandra Jain - Last Filed: 07/12/17 13:32>





History of Present Illness





- History of Present Illness


History of Present Illness: 


PGY-2 Neurology consult note for Dr Moody.








Reason for consult: dizziness





Patient is a 45 y/o with PMH of CAD , MI 2 months ago, 3 stents total, IDDM2, 

Morbidly obese, former tobacco, chronic venous insufficiency presenting with 

dizziness. Patient states since his last MI and stents 2 months ago he has been 

experiencing dizziness. Patient states feels like the room is spinning 

especially when he is walking and bending down. Patient denies tinnitus, denies 

photophobia, denies headache. Patient admits feeling like passing out, but 

denies LOC. Denies fall. Patient states the dizziness got worst last night. 

Patient admits to feeling nauseous during the episode. 


Patient denies cp, sob.





PMH: As stated above


PSH: Cardiac stents


Social: former tobacco, denies alcohol and illicit drug use. 








Review of Systems





- Review of Systems


All systems: reviewed and no additional remarkable complaints except


Review of Systems: 





12 pint ROS reviewed all normal except as per HPI. 





Past Patient History





- Infectious Disease


Hx of Infectious Diseases: None





- Tetanus Immunizations


Tetanus Immunization: Unknown





- Past Medical History & Family History


Past Medical History?: No





- Past Social History


Smoking Status: Light Smoker < 10 Cigarettes Daily


Alcohol: None


Drugs: Denies


Home Situation {Lives}: With Family





- CARDIAC


Hx Cardiac Disorders: No


Other/Comment: cardiac stent x3 2 months ago





- PULMONARY


Hx Respiratory Disorders: No





- NEUROLOGICAL


Hx Paralysis: No





- HEENT


Hx HEENT Problems: No





- RENAL


Hx Chronic Kidney Disease: No





- ENDOCRINE/METABOLIC


Hx Endocrine Disorders: No


Hx Diabetes Mellitus Type 2: Yes





- HEMATOLOGICAL/ONCOLOGICAL


Hx Blood Disorders: No





- INTEGUMENTARY


Hx Dermatological Problems: No


Hx Cellulitis:  (periorbital)





- MUSCULOSKELETAL/RHEUMATOLOGICAL


Hx Musculoskeletal Disorders: No





- GASTROINTESTINAL


Hx Gastrointestinal Disorders: No





- GENITOURINARY/GYNECOLOGICAL


Hx Genitourinary Disorders: No





- PSYCHIATRIC


Hx Psychophysiologic Disorder: No


Hx Substance Use: No





- SURGICAL HISTORY


Hx Surgeries: No





- ANESTHESIA


Hx Anesthesia Reactions: No


Hx Malignant Hyperthermia: No





Meds


Allergies/Adverse Reactions: 


 Allergies











Allergy/AdvReac Type Severity Reaction Status Date / Time


 


No Known Allergies Allergy   Verified 10/28/16 05:36














- Medications


Medications: 


 Current Medications





Insulin Human Regular (Humulin R Med)  10 units SC ACHS MIRYAM


   PRN Reason: Protocol


   Last Admin: 07/12/17 11:00 Dose:  Not Given











Physical Exam





- Constitutional


Appears: No Acute Distress





- Head Exam


Head Exam: ATRAUMATIC, NORMAL INSPECTION, NORMOCEPHALIC





- Eye Exam


Eye Exam: EOMI, Normal appearance, PERRL.  absent: Scleral icterus


Pupil Exam: NORMAL ACCOMODATION, PERRL





- ENT Exam


ENT Exam: Mucous Membranes Moist





- Neck Exam


Neck exam: Positive for: Normal Inspection.  Negative for: Tenderness





- Respiratory Exam


Respiratory Exam: Clear to Auscultation Bilateral, NORMAL BREATHING PATTERN.  

absent: Rales, Rhonchi, Wheezes, Respiratory Distress, Stridor





- Cardiovascular Exam


Cardiovascular Exam: REGULAR RHYTHM, +S1, +S2





- GI/Abdominal Exam


GI & Abdominal Exam: Normal Bowel Sounds, Soft.  absent: Distended, Tenderness





- Extremities Exam


Extremities exam: Positive for: pedal edema (+3 pitting edema)





- Back Exam


Back exam: NORMAL INSPECTION





- Neurological Exam


Neurological exam: Alert, CN II-XII Intact, Oriented x3, Reflexes Normal


Additional comments: 


Speaking in full sentences, awake and alert x3. 


Normal finger to nose, no pronator drip, plantar reflexes normal.


5/5 flexion and extension motor strength b/l upper and lower extremities. No 

tremors, no rigidity. 


Sensation to dull objects in the upper extremities and face intact. symmetrical 

decrease in sensation in b/l lower extremities. 








- Psychiatric Exam


Psychiatric exam: Normal Affect, Normal Mood





- Skin


Skin Exam: Dry, Rash, Warm





Results





- Vital Signs


Recent Vital Signs: 


 Last Vital Signs











Temp  98.2 F   07/12/17 09:17


 


Pulse  76   07/12/17 13:06


 


Resp  18   07/12/17 13:06


 


BP  133/76   07/12/17 13:06


 


Pulse Ox  95   07/12/17 13:06














- Labs


Result Diagrams: 


 07/12/17 09:45





 07/12/17 09:45


Labs: 


 Laboratory Results - last 24 hr











  07/12/17 07/12/17





  12:45 13:29


 


POC Glucose (mg/dL)  > 500 H*  423 H*














Assessment & Plan





- Assessment and Plan (Free Text)


Assessment: 


Patient is a 45 y/o with PMH of CAD , MI 2 months ago, 3 stents total, IDDM2, 

Morbidly obese, former tobacco, chronic venous insufficiency presenting with 

dizziness.





Impression: Dizziness likely secondary to hyperglycemia, and positional vertigo.


Plan: 


- Rec meclizine 25 mg TID


- Control diabetes, consider endo eval


- keep glucose between 140-180. Obtain [id number].


- vestibular therapy


- Monitor and correct electrolytes 


- Hydrate as tolerated cardiac wise


- Continue Lipitor, and plavix. 


- Follow up with cardiology








Patient seen, examined, discussed with Dr Moody. 





- Date & Time


Date: 07/12/17


Time: 13:45





<Nick Moody - Last Filed: 07/12/17 15:04>





Meds





- Medications


Medications: 


 Current Medications





Amlodipine Besylate (Norvasc)  2.5 mg PO DAILY MIRYAM


Aspirin (Aspirin Chewable)  81 mg PO DAILY MIRYAM


Atorvastatin Calcium (Lipitor)  40 mg PO DAILY MIRYAM


Clopidogrel Bisulfate (Plavix)  75 mg PO DAILY MIRYAM


Furosemide (Lasix)  40 mg IVP DAILY MIRYAM


Insulin Detemir (Levemir)  30 unit SC HS MIRYAM


Insulin Human Lispro (Humalog)  12 units SC AC MIRYAM


Insulin Human Lispro (Humalog Low)  0 units SC ACHS MIRYAM


   PRN Reason: Protocol


Insulin Human Regular (Humulin R High)  0 units SC ACHS MIRYAM


   PRN Reason: Protocol


Metformin HCl (Glucophage)  500 mg PO BID MIRYAM


Metoprolol Tartrate (Lopressor)  25 mg PO DAILY MIRYAM


Potassium Chloride (K-Dur 20 Meq Er Tab)  20 meq PO BID MIRYAM











Results





- Vital Signs


Recent Vital Signs: 


 Last Vital Signs











Temp  98.2 F   07/12/17 09:17


 


Pulse  76   07/12/17 13:06


 


Resp  18   07/12/17 13:06


 


BP  133/76   07/12/17 13:06


 


Pulse Ox  95   07/12/17 13:06














- Labs


Result Diagrams: 


 07/12/17 09:45





 07/12/17 09:45


Labs: 


 Laboratory Results - last 24 hr











  07/12/17 07/12/17





  12:45 13:29


 


POC Glucose (mg/dL)  > 500 H*  423 H*














Attending/Attestation





- Attestation


I have personally seen and examined this patient.: Yes


I have fully participated in the care of the patient.: Yes


I have reviewed all pertinent clinical information: Yes
History of Present Illness





- History of Present Illness


History of Present Illness: 


The patient is a 46-year-old male who present with dizziness and orthostatic 

changes. He was given Meclizine as an outpatient, in which he did not take his 

medications until he arrived to emergency room.





The patient's past medical history includes myocardial infarction, treated 

twice. The latest was 2 months ago at [facility]. The patient 

has had multi-vessel PTC stents in the past. He suffers from severe diabetes 

mellitus, hypertension, and has been noncompliant with his dietary 

restrictions. In addition, he is former tobacco user, in which he has recently 

stopped.





He denies chest pain and shortness of breath.











Review of Systems





- Review of Systems


All systems: reviewed and no additional remarkable complaints except





- Cardiovascular


Cardiovascular: absent: Chest Pain





- Respiratory


Respiratory: absent: Dyspnea





Past Patient History





- Infectious Disease


Hx of Infectious Diseases: None





- Tetanus Immunizations


Tetanus Immunization: Unknown





- Past Medical History & Family History


Past Medical History?: No





- Past Social History


Smoking Status: Former Smoker (recently stopped)





- CARDIAC


Hx Cardiac Disorders: No


Hx Heart Attack: Yes (x2)


Hx Hypertension: Yes


Other/Comment: cardiac stent x3 2 months ago





- PULMONARY


Hx Respiratory Disorders: No





- NEUROLOGICAL


Hx Paralysis: No





- HEENT


Hx HEENT Problems: No





- RENAL


Hx Chronic Kidney Disease: No





- ENDOCRINE/METABOLIC


Hx Endocrine Disorders: No


Hx Diabetes Mellitus Type 2: Yes





- HEMATOLOGICAL/ONCOLOGICAL


Hx Blood Disorders: No





- INTEGUMENTARY


Hx Dermatological Problems: No


Hx Cellulitis:  (periorbital)





- MUSCULOSKELETAL/RHEUMATOLOGICAL


Hx Musculoskeletal Disorders: No





- GASTROINTESTINAL


Hx Gastrointestinal Disorders: No





- GENITOURINARY/GYNECOLOGICAL


Hx Genitourinary Disorders: No





- PSYCHIATRIC


Hx Psychophysiologic Disorder: No


Hx Substance Use: No





- SURGICAL HISTORY


Hx Surgeries: Yes (CARD STENTS X 3,)


Hx Coronary Stent: Yes (X3)





- ANESTHESIA


Hx Anesthesia Reactions: No


Hx Malignant Hyperthermia: No





Meds


Home Medications: 


 Home Medication List











 Medication  Instructions  Recorded  Confirmed  Type


 


Insulin Detemir [Levemir] 30 unit SC HS #1 unit 07/13/17  Rx


 


Insulin Lispro [humALOG] 12 units SC AC #1 ml 07/13/17  Rx


 


Meclizine [Meclizine*] 25 mg PO Q8H #40 tab 07/13/17  Rx


 


Potassium Chloride [K-Dur 20 mEq 20 meq PO DAILY #30 tab 07/13/17  Rx





ER Tab]    











Allergies/Adverse Reactions: 


 Allergies











Allergy/AdvReac Type Severity Reaction Status Date / Time


 


No Known Allergies Allergy   Verified 07/12/17 15:13














Physical Exam





- Neck Exam


Additional comments: 





Negative JVD





- Respiratory Exam


Respiratory Exam: absent: Rales





- Cardiovascular Exam


Cardiovascular Exam: +S1, +S2





- Extremities Exam


Extremities exam: Negative for: pedal edema





Results





- Vital Signs


Recent Vital Signs: 


7/12/17:


Blood pressure is 133/76 


Heart rate is in the 70s 








 Last Vital Signs











Temp  99.6 F   07/13/17 08:59


 


Pulse  81   07/13/17 10:00


 


Resp  20   07/13/17 08:59


 


BP  163/86 H  07/13/17 09:15


 


Pulse Ox  98   07/13/17 08:59














- Labs


Result Diagrams: 


 07/13/17 06:30





 07/13/17 06:30


Labs: 


7/12/17:


Glucose: 624 


BUN and creatinine of 24 and 1.4 


Hemoglobin is 12








- EKG Data


EKG comments: 


EKG shows normal sinus rhythm with poor outward progression, and nonspecific ST-

T changes.





Assessment & Plan





- Assessment and Plan (Free Text)


Assessment: 





1. Dizziness consistent with vertigo


2. Stable angina


3. History of myocardial infarction


4. Multi-vessel PTC stent in the past


5. Diabetes Mellitus


6. Hypertension


7. Hypercholesterolemia 


8. Noncompliance with medication and cardiac risk reduction


Plan: 


Given these finding, the patient's diabetes is out of control. We'll need to 

control his sugar aggressively.





I discussed with the patient again about cardiac risk reduction program.





We'll repeat an echocardiography to evaluate his LD function.








- Date & Time


Date: 07/12/17
67M with PMH Of chronic lymphedema, prostate cancer, hyperaldosteronism and HTN presenting with worsening erythema, pain and swelling of left lower extremity, concerning for cellulitis and admitted for further workup.     Physical exam  General: no acute distress, sitting up in bed  HEENT: normocephalic, atraumatic, MMM  Cards: RRR, normal S1S2, no mrg  Pulm: CTAB, no wheeze, crackles, rales or rhonchi  Ab: soft, nontender, nondistended, normoactive bowel sounds  Ext: LLE wrapped, RLE warm, no lesions  Neuro: speech is fluent, follows commands, no facial asymmetry, no acute deficits noted    #Cellulitis  - appreciate input from vascular  - no abscess on CT and on DVT on dopplers  - continue with vancomycin and zosyn and can likely deescalate antibiotic therapy by tomorrow, pending clinical improvement    High level of medical decision making required for this case, including the presence of significant acute cellulitis with significant symptoms, as well as treatment with vancomycin, which requires close monitoring for toxicity.